# Patient Record
Sex: MALE | Race: BLACK OR AFRICAN AMERICAN | HISPANIC OR LATINO | Employment: OTHER | URBAN - METROPOLITAN AREA
[De-identification: names, ages, dates, MRNs, and addresses within clinical notes are randomized per-mention and may not be internally consistent; named-entity substitution may affect disease eponyms.]

---

## 2022-01-01 ENCOUNTER — HOSPITAL ENCOUNTER (OUTPATIENT)
Dept: MRI IMAGING | Facility: HOSPITAL | Age: 72
Discharge: HOME | End: 2022-12-28
Admitting: FAMILY MEDICINE

## 2022-01-01 ENCOUNTER — OFFICE VISIT (OUTPATIENT)
Dept: FAMILY MEDICINE CLINIC | Facility: CLINIC | Age: 72
End: 2022-01-01

## 2022-01-01 ENCOUNTER — TELEPHONE (OUTPATIENT)
Dept: FAMILY MEDICINE CLINIC | Facility: CLINIC | Age: 72
End: 2022-01-01

## 2022-01-01 ENCOUNTER — LAB (OUTPATIENT)
Dept: LAB | Facility: HOSPITAL | Age: 72
End: 2022-01-01

## 2022-01-01 VITALS
OXYGEN SATURATION: 99 % | HEART RATE: 68 BPM | BODY MASS INDEX: 31.08 KG/M2 | WEIGHT: 198 LBS | SYSTOLIC BLOOD PRESSURE: 148 MMHG | TEMPERATURE: 97.2 F | DIASTOLIC BLOOD PRESSURE: 81 MMHG | HEIGHT: 67 IN | RESPIRATION RATE: 18 BRPM

## 2022-01-01 VITALS
SYSTOLIC BLOOD PRESSURE: 152 MMHG | TEMPERATURE: 98.1 F | OXYGEN SATURATION: 99 % | HEART RATE: 56 BPM | RESPIRATION RATE: 18 BRPM | HEIGHT: 67 IN | DIASTOLIC BLOOD PRESSURE: 98 MMHG | BODY MASS INDEX: 31.3 KG/M2 | WEIGHT: 199.4 LBS

## 2022-01-01 DIAGNOSIS — R39.198 DIFFICULTY IN URINATION: ICD-10-CM

## 2022-01-01 DIAGNOSIS — R33.9 URINARY RETENTION: ICD-10-CM

## 2022-01-01 DIAGNOSIS — R97.20 ELEVATED PSA: Primary | ICD-10-CM

## 2022-01-01 DIAGNOSIS — Z13.220 SCREENING FOR LIPID DISORDERS: ICD-10-CM

## 2022-01-01 DIAGNOSIS — Z00.00 ANNUAL PHYSICAL EXAM: ICD-10-CM

## 2022-01-01 DIAGNOSIS — E78.2 MIXED HYPERLIPIDEMIA: ICD-10-CM

## 2022-01-01 DIAGNOSIS — R97.20 ELEVATED PSA MEASUREMENT: ICD-10-CM

## 2022-01-01 DIAGNOSIS — E66.09 CLASS 1 OBESITY DUE TO EXCESS CALORIES WITHOUT SERIOUS COMORBIDITY WITH BODY MASS INDEX (BMI) OF 31.0 TO 31.9 IN ADULT: Chronic | ICD-10-CM

## 2022-01-01 DIAGNOSIS — R41.3 MEMORY LOSS: Chronic | ICD-10-CM

## 2022-01-01 DIAGNOSIS — Z12.5 SCREENING FOR PROSTATE CANCER: ICD-10-CM

## 2022-01-01 DIAGNOSIS — R41.3 MEMORY LOSS: Primary | ICD-10-CM

## 2022-01-01 DIAGNOSIS — R41.3 MEMORY LOSS: ICD-10-CM

## 2022-01-01 DIAGNOSIS — E66.09 CLASS 1 OBESITY DUE TO EXCESS CALORIES WITHOUT SERIOUS COMORBIDITY WITH BODY MASS INDEX (BMI) OF 31.0 TO 31.9 IN ADULT: ICD-10-CM

## 2022-01-01 LAB
ALBUMIN SERPL-MCNC: 4 G/DL (ref 3.5–5.2)
ALBUMIN/GLOB SERPL: 1.3 G/DL
ALP SERPL-CCNC: 83 U/L (ref 39–117)
ALT SERPL W P-5'-P-CCNC: 10 U/L (ref 1–41)
ANION GAP SERPL CALCULATED.3IONS-SCNC: 11.5 MMOL/L (ref 5–15)
AST SERPL-CCNC: 26 U/L (ref 1–40)
BASOPHILS # BLD AUTO: 0.03 10*3/MM3 (ref 0–0.2)
BASOPHILS NFR BLD AUTO: 0.7 % (ref 0–1.5)
BILIRUB BLD-MCNC: NEGATIVE MG/DL
BILIRUB SERPL-MCNC: 0.6 MG/DL (ref 0–1.2)
BUN SERPL-MCNC: 15 MG/DL (ref 8–23)
BUN/CREAT SERPL: 14.3 (ref 7–25)
CALCIUM SPEC-SCNC: 9.8 MG/DL (ref 8.6–10.5)
CHLORIDE SERPL-SCNC: 102 MMOL/L (ref 98–107)
CHOLEST SERPL-MCNC: 249 MG/DL (ref 0–200)
CLARITY, POC: CLEAR
CO2 SERPL-SCNC: 25.5 MMOL/L (ref 22–29)
COLOR UR: YELLOW
CREAT SERPL-MCNC: 1.05 MG/DL (ref 0.76–1.27)
DEPRECATED RDW RBC AUTO: 42.1 FL (ref 37–54)
EGFRCR SERPLBLD CKD-EPI 2021: 76.4 ML/MIN/1.73
EOSINOPHIL # BLD AUTO: 0.05 10*3/MM3 (ref 0–0.4)
EOSINOPHIL NFR BLD AUTO: 1.1 % (ref 0.3–6.2)
ERYTHROCYTE [DISTWIDTH] IN BLOOD BY AUTOMATED COUNT: 13.4 % (ref 12.3–15.4)
EXPIRATION DATE: NORMAL
GLOBULIN UR ELPH-MCNC: 3.1 GM/DL
GLUCOSE SERPL-MCNC: 111 MG/DL (ref 65–99)
GLUCOSE UR STRIP-MCNC: NEGATIVE MG/DL
HCT VFR BLD AUTO: 44.2 % (ref 37.5–51)
HDLC SERPL-MCNC: 54 MG/DL (ref 40–60)
HGB BLD-MCNC: 14.4 G/DL (ref 13–17.7)
IMM GRANULOCYTES # BLD AUTO: 0.03 10*3/MM3 (ref 0–0.05)
IMM GRANULOCYTES NFR BLD AUTO: 0.7 % (ref 0–0.5)
KETONES UR QL: NEGATIVE
LDLC SERPL CALC-MCNC: 173 MG/DL (ref 0–100)
LDLC/HDLC SERPL: 3.15 {RATIO}
LEUKOCYTE EST, POC: NEGATIVE
LYMPHOCYTES # BLD AUTO: 1.49 10*3/MM3 (ref 0.7–3.1)
LYMPHOCYTES NFR BLD AUTO: 33.6 % (ref 19.6–45.3)
Lab: NORMAL
MCH RBC QN AUTO: 28.2 PG (ref 26.6–33)
MCHC RBC AUTO-ENTMCNC: 32.6 G/DL (ref 31.5–35.7)
MCV RBC AUTO: 86.7 FL (ref 79–97)
MONOCYTES # BLD AUTO: 0.65 10*3/MM3 (ref 0.1–0.9)
MONOCYTES NFR BLD AUTO: 14.6 % (ref 5–12)
NEUTROPHILS NFR BLD AUTO: 2.19 10*3/MM3 (ref 1.7–7)
NEUTROPHILS NFR BLD AUTO: 49.3 % (ref 42.7–76)
NITRITE UR-MCNC: NEGATIVE MG/ML
NRBC BLD AUTO-RTO: 0 /100 WBC (ref 0–0.2)
PH UR: 5.5 [PH] (ref 5–8)
PLATELET # BLD AUTO: 220 10*3/MM3 (ref 140–450)
PMV BLD AUTO: 10.1 FL (ref 6–12)
POTASSIUM SERPL-SCNC: 4.6 MMOL/L (ref 3.5–5.2)
PROT SERPL-MCNC: 7.1 G/DL (ref 6–8.5)
PROT UR STRIP-MCNC: NEGATIVE MG/DL
PSA SERPL-MCNC: 9.38 NG/ML (ref 0–4)
RBC # BLD AUTO: 5.1 10*6/MM3 (ref 4.14–5.8)
RBC # UR STRIP: NEGATIVE /UL
SODIUM SERPL-SCNC: 139 MMOL/L (ref 136–145)
SP GR UR: 1.02 (ref 1–1.03)
TRIGL SERPL-MCNC: 125 MG/DL (ref 0–150)
TSH SERPL DL<=0.05 MIU/L-ACNC: 2.46 UIU/ML (ref 0.27–4.2)
UROBILINOGEN UR QL: NORMAL
VLDLC SERPL-MCNC: 22 MG/DL (ref 5–40)
WBC NRBC COR # BLD: 4.44 10*3/MM3 (ref 3.4–10.8)

## 2022-01-01 PROCEDURE — 85025 COMPLETE CBC W/AUTO DIFF WBC: CPT

## 2022-01-01 PROCEDURE — 84443 ASSAY THYROID STIM HORMONE: CPT

## 2022-01-01 PROCEDURE — 70551 MRI BRAIN STEM W/O DYE: CPT

## 2022-01-01 PROCEDURE — 99204 OFFICE O/P NEW MOD 45 MIN: CPT | Performed by: FAMILY MEDICINE

## 2022-01-01 PROCEDURE — 81003 URINALYSIS AUTO W/O SCOPE: CPT | Performed by: FAMILY MEDICINE

## 2022-01-01 PROCEDURE — 36415 COLL VENOUS BLD VENIPUNCTURE: CPT

## 2022-01-01 PROCEDURE — 80053 COMPREHEN METABOLIC PANEL: CPT

## 2022-01-01 PROCEDURE — 99214 OFFICE O/P EST MOD 30 MIN: CPT | Performed by: FAMILY MEDICINE

## 2022-01-01 PROCEDURE — 80061 LIPID PANEL: CPT

## 2022-01-01 PROCEDURE — G0103 PSA SCREENING: HCPCS

## 2022-01-01 RX ORDER — TAMSULOSIN HYDROCHLORIDE 0.4 MG/1
1 CAPSULE ORAL DAILY
Qty: 30 CAPSULE | Refills: 2 | Status: SHIPPED | OUTPATIENT
Start: 2022-01-01 | End: 2023-01-01

## 2022-01-01 RX ORDER — ROSUVASTATIN CALCIUM 20 MG/1
20 TABLET, COATED ORAL DAILY
Qty: 90 TABLET | Refills: 1 | Status: SHIPPED | OUTPATIENT
Start: 2022-01-01 | End: 2023-01-01 | Stop reason: SDUPTHER

## 2022-12-05 NOTE — TELEPHONE ENCOUNTER
Caller: LATOYA PATTERSON    Relationship: Child    Best call back number:  OR      MATTHEW ALFONSO    [phone]    What is the best time to reach you:  ANYTIME     Who are you requesting to speak with (clinical staff, provider,  specific staff member): CLINICAL          What was the call regarding:      THE PATIENT'S DAUGHTER SAID THE PATIENT HAS MEMORY LOSS AND WILL NOT  GIVE ALL OF THE INFORMATION NEEDED TO TELL THE PROVIDER. SHE SAID THE PATIENT IS NEEDING TO BE SEEN BY NEUROLOGIST. SHE IS REQUESTING A CALL FROM PCP TO GO OVER THE PATIENT'S MEDICAL HISTORY.    SHE SAID IT WILL BE OK TO CALL HER SISTER SINCE SHE IS THE EMERGENCY CONTACT FOR THE PATIENT.             Do you require a callback:  YES

## 2022-12-05 NOTE — PROGRESS NOTES
"Chief Complaint  Establish Care, Ear Fullness (Right ), and Difficulty Urinating    Subjective        Vince Jain presents to [facility]  History of Present Illness  He is here today to establish relations as a new patient. He is  and has three sons and three daughters. They have 7 grandchildren.     Has hx of closed head injury x 2. Having memory trouble that is worsening. Family concerned.     The patient has no other complaints today and denies chest pain, shortness of breath, weakness, numbness, nausea, vomiting, diarrhea, dizziness or syncopal event.        Objective   Vital Signs:  /98 (BP Location: Left arm, Patient Position: Sitting)   Pulse 56   Temp 98.1 °F (36.7 °C)   Resp 18   Ht 170.2 cm (67\")   Wt 90.4 kg (199 lb 6.4 oz)   SpO2 99%   BMI 31.23 kg/m²   Estimated body mass index is 31.23 kg/m² as calculated from the following:    Height as of this encounter: 170.2 cm (67\").    Weight as of this encounter: 90.4 kg (199 lb 6.4 oz).          Physical Exam  Vitals reviewed.   Constitutional:       Appearance: Normal appearance. He is well-developed.   HENT:      Head: Normocephalic and atraumatic.      Right Ear: External ear normal.      Left Ear: External ear normal.      Mouth/Throat:      Pharynx: No oropharyngeal exudate.   Eyes:      Conjunctiva/sclera: Conjunctivae normal.      Pupils: Pupils are equal, round, and reactive to light.   Neck:      Vascular: No carotid bruit.   Cardiovascular:      Rate and Rhythm: Normal rate and regular rhythm.      Heart sounds: No murmur heard.    No friction rub. No gallop.   Pulmonary:      Effort: Pulmonary effort is normal.      Breath sounds: Normal breath sounds. No wheezing or rhonchi.   Abdominal:      General: There is no distension.   Skin:     General: Skin is warm and dry.   Neurological:      Mental Status: He is alert and oriented to person, place, and time.      Cranial Nerves: No cranial " nerve deficit.      Motor: No weakness.   Psychiatric:         Mood and Affect: Mood and affect normal.         Behavior: Behavior normal.         Thought Content: Thought content normal.         Judgment: Judgment normal.        Result Review :    CMP    CMP 12/5/22   Glucose 111 (A)   BUN 15   Creatinine 1.05   Sodium 139   Potassium 4.6   Chloride 102   Calcium 9.8   Albumin 4.00   Total Bilirubin 0.6   Alkaline Phosphatase 83   AST (SGOT) 26   ALT (SGPT) 10   (A) Abnormal value            CBC    CBC 12/5/22   WBC 4.44   RBC 5.10   Hemoglobin 14.4   Hematocrit 44.2   MCV 86.7   MCH 28.2   MCHC 32.6   RDW 13.4   Platelets 220           Lipid Panel    Lipid Panel 12/5/22   Total Cholesterol 249 (A)   Triglycerides 125   HDL Cholesterol 54   VLDL Cholesterol 22   LDL Cholesterol  173 (A)   LDL/HDL Ratio 3.15   (A) Abnormal value            TSH    TSH 12/5/22   TSH 2.460                     Assessment and Plan   Diagnoses and all orders for this visit:    1. Memory loss (Primary)  Assessment & Plan:  MRI ordered today. We will see him back in a week, review labs, review image and do a MMSE.     Orders:  -     MRI Brain Without Contrast; Future    2. Difficulty in urination  -     POCT urinalysis dipstick, automated    3. Annual physical exam  -     Comprehensive Metabolic Panel; Future  -     CBC & Differential; Future  -     TSH; Future    4. Screening for prostate cancer  -     PSA Screen; Future    5. Screening for lipid disorders  -     Lipid Panel; Future    6. Class 1 obesity due to excess calories without serious comorbidity with body mass index (BMI) of 31.0 to 31.9 in adult  Assessment & Plan:  Patient's (Body mass index is 31.23 kg/m².) indicates that they are obese (BMI >30) with health conditions that include none . Weight is newly identified. BMI is is above average; BMI management plan is completed. We discussed low calorie, low carb based diet program, portion control and increasing exercise.        Other orders  -     tamsulosin (FLOMAX) 0.4 MG capsule 24 hr capsule; Take 1 capsule by mouth Daily.  Dispense: 30 capsule; Refill: 2           Follow Up   Return in about 1 week (around 12/12/2022).  Patient was given instructions and counseling regarding his condition or for health maintenance advice. Please see specific information pulled into the AVS if appropriate.

## 2022-12-08 NOTE — TELEPHONE ENCOUNTER
Spoke with patient's daughter Reddy. Patient has follow up on 12/19 and MRI is scheduled 12/28. Reddy would like to go over results with Dr. Schneider when they are available.     She is concerned about the decline in her fathers memory. He has fallen multiple times, he seems to have short term memory loss, and whenever they try to dicuss things with him he gets upset/discouraged, not agitated but sad like because of how his memory has changed    Patient's daughter is aware of scheduled appointments and is on  verbal

## 2022-12-10 PROBLEM — E66.09 CLASS 1 OBESITY DUE TO EXCESS CALORIES WITHOUT SERIOUS COMORBIDITY WITH BODY MASS INDEX (BMI) OF 31.0 TO 31.9 IN ADULT: Status: ACTIVE | Noted: 2022-01-01

## 2022-12-10 PROBLEM — R41.3 MEMORY LOSS: Chronic | Status: ACTIVE | Noted: 2022-01-01

## 2022-12-10 PROBLEM — R41.3 MEMORY LOSS: Status: ACTIVE | Noted: 2022-01-01

## 2022-12-10 NOTE — ASSESSMENT & PLAN NOTE
Patient's (Body mass index is 31.23 kg/m².) indicates that they are obese (BMI >30) with health conditions that include none . Weight is newly identified. BMI is is above average; BMI management plan is completed. We discussed low calorie, low carb based diet program, portion control and increasing exercise.

## 2022-12-19 PROBLEM — E78.2 MIXED HYPERLIPIDEMIA: Status: ACTIVE | Noted: 2022-01-01

## 2022-12-19 PROBLEM — E78.2 MIXED HYPERLIPIDEMIA: Chronic | Status: ACTIVE | Noted: 2022-01-01

## 2022-12-19 PROBLEM — R33.9 URINARY RETENTION: Status: ACTIVE | Noted: 2022-01-01

## 2022-12-19 PROBLEM — E66.09 CLASS 1 OBESITY DUE TO EXCESS CALORIES WITHOUT SERIOUS COMORBIDITY WITH BODY MASS INDEX (BMI) OF 31.0 TO 31.9 IN ADULT: Chronic | Status: ACTIVE | Noted: 2022-01-01

## 2022-12-19 PROBLEM — R97.20 ELEVATED PSA: Status: ACTIVE | Noted: 2022-01-01

## 2022-12-19 NOTE — ASSESSMENT & PLAN NOTE
Lipid abnormalities are newly diagnosed.  Nutritional counseling was provided. and Pharmacotherapy as ordered. He was started on Crestor 20 mg daily.   Lipids will be reassessed in 6 months.    The 10-year ASCVD risk score (Nickolas RAMIREZ, et al., 2019) is: 24.4%    Values used to calculate the score:      Age: 72 years      Sex: Male      Is Non- : No      Diabetic: No      Tobacco smoker: No      Systolic Blood Pressure: 148 mmHg      Is BP treated: No      HDL Cholesterol: 54 mg/dL      Total Cholesterol: 249 mg/dL

## 2022-12-19 NOTE — ASSESSMENT & PLAN NOTE
Patient's (Body mass index is 31 kg/m².) indicates that they are obese (BMI >30) with health conditions that include dyslipidemias . Weight is improving with lifestyle modifications. BMI is is above average; BMI management plan is completed. We discussed low calorie, low carb based diet program, portion control and increasing exercise.

## 2022-12-19 NOTE — PROGRESS NOTES
"Chief Complaint  Ear Fullness (Right worse then left )    Subjective        Vince Jain presents to [facility]  History of Present Illness   He is here today for management of his chronic medical conditions. He is  and has three sons and three daughters. They have 7 grandchildren.      Has hx of closed head injury x 2. Having memory trouble that is worsening. Family concerned.      The patient has no other complaints today and denies chest pain, shortness of breath, weakness, numbness, nausea, vomiting, diarrhea, dizziness or syncopal event.      Objective   Vital Signs:  /81 (BP Location: Left arm, Patient Position: Sitting)   Pulse 68   Temp 97.2 °F (36.2 °C)   Resp 18   Ht 170.2 cm (67.01\")   Wt 89.8 kg (198 lb)   SpO2 99%   BMI 31.00 kg/m²   Estimated body mass index is 31 kg/m² as calculated from the following:    Height as of this encounter: 170.2 cm (67.01\").    Weight as of this encounter: 89.8 kg (198 lb).          Physical Exam  Vitals reviewed.   Constitutional:       Appearance: Normal appearance. He is well-developed.   HENT:      Head: Normocephalic and atraumatic.      Right Ear: External ear normal.      Left Ear: External ear normal.      Mouth/Throat:      Pharynx: No oropharyngeal exudate.   Eyes:      Conjunctiva/sclera: Conjunctivae normal.      Pupils: Pupils are equal, round, and reactive to light.   Neck:      Vascular: No carotid bruit.   Cardiovascular:      Rate and Rhythm: Normal rate and regular rhythm.      Heart sounds: No murmur heard.    No friction rub. No gallop.   Pulmonary:      Effort: Pulmonary effort is normal.      Breath sounds: Normal breath sounds. No wheezing or rhonchi.   Abdominal:      General: There is no distension.   Skin:     General: Skin is warm and dry.   Neurological:      Mental Status: He is alert and oriented to person, place, and time.      Cranial Nerves: No cranial nerve deficit.      Motor: No " weakness.   Psychiatric:         Mood and Affect: Mood and affect normal.         Behavior: Behavior normal.         Thought Content: Thought content normal.         Judgment: Judgment normal.        Result Review :    CMP    CMP 12/5/22   Glucose 111 (A)   BUN 15   Creatinine 1.05   Sodium 139   Potassium 4.6   Chloride 102   Calcium 9.8   Albumin 4.00   Total Bilirubin 0.6   Alkaline Phosphatase 83   AST (SGOT) 26   ALT (SGPT) 10   (A) Abnormal value            CBC    CBC 12/5/22   WBC 4.44   RBC 5.10   Hemoglobin 14.4   Hematocrit 44.2   MCV 86.7   MCH 28.2   MCHC 32.6   RDW 13.4   Platelets 220           Lipid Panel    Lipid Panel 12/5/22   Total Cholesterol 249 (A)   Triglycerides 125   HDL Cholesterol 54   VLDL Cholesterol 22   LDL Cholesterol  173 (A)   LDL/HDL Ratio 3.15   (A) Abnormal value            TSH    TSH 12/5/22   TSH 2.460                     Assessment and Plan   Diagnoses and all orders for this visit:    1. Elevated PSA (Primary)  Assessment & Plan:  He was educated about the three major causes of elevated PSA.   1. Enlarged prostate  2. Prostatitis  3. Prostate cancer    He has a referral to urology.       2. Memory loss  -     Ambulatory Referral to Neurology    3. Urinary retention  Assessment & Plan:  His symptoms are doing better with Flomax 0.4 mg nightly.       4. Mixed hyperlipidemia  Assessment & Plan:  Lipid abnormalities are newly diagnosed.  Nutritional counseling was provided. and Pharmacotherapy as ordered. He was started on Crestor 20 mg daily.   Lipids will be reassessed in 6 months.    The 10-year ASCVD risk score (Nickolas RAMIREZ, et al., 2019) is: 24.4%    Values used to calculate the score:      Age: 72 years      Sex: Male      Is Non- : No      Diabetic: No      Tobacco smoker: No      Systolic Blood Pressure: 148 mmHg      Is BP treated: No      HDL Cholesterol: 54 mg/dL      Total Cholesterol: 249 mg/dL        5. Class 1 obesity due to excess calories  without serious comorbidity with body mass index (BMI) of 31.0 to 31.9 in adult  Assessment & Plan:  Patient's (Body mass index is 31 kg/m².) indicates that they are obese (BMI >30) with health conditions that include dyslipidemias . Weight is improving with lifestyle modifications. BMI is is above average; BMI management plan is completed. We discussed low calorie, low carb based diet program, portion control and increasing exercise.       Other orders  -     rosuvastatin (Crestor) 20 MG tablet; Take 1 tablet by mouth Daily.  Dispense: 90 tablet; Refill: 1           Follow Up   Return in about 2 months (around 2/19/2023).  Patient was given instructions and counseling regarding his condition or for health maintenance advice. Please see specific information pulled into the AVS if appropriate.

## 2022-12-19 NOTE — ASSESSMENT & PLAN NOTE
He was educated about the three major causes of elevated PSA.   1. Enlarged prostate  2. Prostatitis  3. Prostate cancer    He has a referral to urology.

## 2023-01-01 ENCOUNTER — APPOINTMENT (OUTPATIENT)
Dept: GENERAL RADIOLOGY | Facility: HOSPITAL | Age: 73
DRG: 215 | End: 2023-01-01
Payer: MEDICARE

## 2023-01-01 ENCOUNTER — OFFICE VISIT (OUTPATIENT)
Dept: UROLOGY | Facility: CLINIC | Age: 73
End: 2023-01-01
Payer: MEDICARE

## 2023-01-01 ENCOUNTER — TELEPHONE (OUTPATIENT)
Dept: UROLOGY | Facility: CLINIC | Age: 73
End: 2023-01-01
Payer: MEDICARE

## 2023-01-01 ENCOUNTER — LAB (OUTPATIENT)
Dept: LAB | Facility: HOSPITAL | Age: 73
End: 2023-01-01
Payer: MEDICARE

## 2023-01-01 ENCOUNTER — APPOINTMENT (OUTPATIENT)
Dept: CARDIOLOGY | Facility: HOSPITAL | Age: 73
DRG: 215 | End: 2023-01-01
Payer: MEDICARE

## 2023-01-01 ENCOUNTER — OFFICE VISIT (OUTPATIENT)
Dept: FAMILY MEDICINE CLINIC | Facility: CLINIC | Age: 73
End: 2023-01-01
Payer: MEDICARE

## 2023-01-01 ENCOUNTER — PREP FOR SURGERY (OUTPATIENT)
Dept: OTHER | Facility: HOSPITAL | Age: 73
End: 2023-01-01
Payer: MEDICARE

## 2023-01-01 ENCOUNTER — HOSPITAL ENCOUNTER (INPATIENT)
Facility: HOSPITAL | Age: 73
LOS: 4 days | Discharge: ANOTHER HOSPITAL | DRG: 215 | End: 2023-03-10
Attending: INTERNAL MEDICINE | Admitting: INTERNAL MEDICINE
Payer: MEDICARE

## 2023-01-01 VITALS
HEIGHT: 68 IN | HEART RATE: 62 BPM | TEMPERATURE: 96.2 F | SYSTOLIC BLOOD PRESSURE: 145 MMHG | BODY MASS INDEX: 32.09 KG/M2 | DIASTOLIC BLOOD PRESSURE: 97 MMHG | WEIGHT: 211.7 LBS | OXYGEN SATURATION: 98 % | RESPIRATION RATE: 18 BRPM

## 2023-01-01 VITALS
HEART RATE: 91 BPM | DIASTOLIC BLOOD PRESSURE: 64 MMHG | SYSTOLIC BLOOD PRESSURE: 105 MMHG | HEIGHT: 65 IN | OXYGEN SATURATION: 94 % | TEMPERATURE: 99 F | WEIGHT: 210.76 LBS | RESPIRATION RATE: 20 BRPM | BODY MASS INDEX: 35.11 KG/M2

## 2023-01-01 VITALS — HEIGHT: 68 IN | WEIGHT: 208 LBS | BODY MASS INDEX: 31.52 KG/M2

## 2023-01-01 DIAGNOSIS — E66.09 CLASS 1 OBESITY DUE TO EXCESS CALORIES WITHOUT SERIOUS COMORBIDITY WITH BODY MASS INDEX (BMI) OF 31.0 TO 31.9 IN ADULT: Chronic | ICD-10-CM

## 2023-01-01 DIAGNOSIS — I21.3 ST ELEVATION MYOCARDIAL INFARCTION (STEMI), UNSPECIFIED ARTERY: Primary | ICD-10-CM

## 2023-01-01 DIAGNOSIS — R97.20 ELEVATED PSA: ICD-10-CM

## 2023-01-01 DIAGNOSIS — R73.9 ELEVATED BLOOD SUGAR: ICD-10-CM

## 2023-01-01 DIAGNOSIS — R60.0 LOWER EXTREMITY EDEMA: ICD-10-CM

## 2023-01-01 DIAGNOSIS — E78.2 MIXED HYPERLIPIDEMIA: Chronic | ICD-10-CM

## 2023-01-01 DIAGNOSIS — R60.0 LOWER EXTREMITY EDEMA: Primary | ICD-10-CM

## 2023-01-01 DIAGNOSIS — R97.20 ELEVATED PROSTATE SPECIFIC ANTIGEN (PSA): Primary | ICD-10-CM

## 2023-01-01 DIAGNOSIS — R57.0 CARDIOGENIC SHOCK: ICD-10-CM

## 2023-01-01 DIAGNOSIS — R97.20 ELEVATED PSA: Primary | ICD-10-CM

## 2023-01-01 LAB
ACT BLD: 197 SECONDS (ref 89–137)
ACT BLD: 227 SECONDS (ref 89–137)
ACT BLD: 245 SECONDS (ref 89–137)
ACT BLD: 275 SECONDS (ref 89–137)
ACT BLD: 275 SECONDS (ref 89–137)
ACT BLD: 311 SECONDS (ref 89–137)
ACT BLD: 335 SECONDS (ref 89–137)
ALBUMIN SERPL-MCNC: 3 G/DL (ref 3.5–5.2)
ALBUMIN SERPL-MCNC: 3.1 G/DL (ref 3.5–5.2)
ALBUMIN SERPL-MCNC: 3.4 G/DL (ref 3.5–5.2)
ALBUMIN SERPL-MCNC: 3.5 G/DL (ref 3.5–5.2)
ALBUMIN SERPL-MCNC: 4.3 G/DL (ref 3.5–5.2)
ALBUMIN/GLOB SERPL: 1.3 G/DL
ALBUMIN/GLOB SERPL: 1.4 G/DL
ALBUMIN/GLOB SERPL: 1.4 G/DL
ALBUMIN/GLOB SERPL: 1.6 G/DL
ALBUMIN/GLOB SERPL: 1.6 G/DL
ALBUMIN/GLOB SERPL: 1.7 G/DL
ALP SERPL-CCNC: 102 U/L (ref 39–117)
ALP SERPL-CCNC: 147 U/L (ref 39–117)
ALP SERPL-CCNC: 78 U/L (ref 39–117)
ALP SERPL-CCNC: 78 U/L (ref 39–117)
ALP SERPL-CCNC: 84 U/L (ref 39–117)
ALP SERPL-CCNC: 92 U/L (ref 39–117)
ALT SERPL W P-5'-P-CCNC: 19 U/L (ref 1–41)
ALT SERPL W P-5'-P-CCNC: 60 U/L (ref 1–41)
ALT SERPL W P-5'-P-CCNC: 70 U/L (ref 1–41)
ALT SERPL W P-5'-P-CCNC: 78 U/L (ref 1–41)
ALT SERPL W P-5'-P-CCNC: 80 U/L (ref 1–41)
ALT SERPL W P-5'-P-CCNC: 94 U/L (ref 1–41)
ANION GAP SERPL CALCULATED.3IONS-SCNC: 11.3 MMOL/L (ref 5–15)
ANION GAP SERPL CALCULATED.3IONS-SCNC: 11.7 MMOL/L (ref 5–15)
ANION GAP SERPL CALCULATED.3IONS-SCNC: 12 MMOL/L (ref 5–15)
ANION GAP SERPL CALCULATED.3IONS-SCNC: 12.3 MMOL/L (ref 5–15)
ANION GAP SERPL CALCULATED.3IONS-SCNC: 13 MMOL/L (ref 5–15)
ANION GAP SERPL CALCULATED.3IONS-SCNC: 13.4 MMOL/L (ref 5–15)
ANION GAP SERPL CALCULATED.3IONS-SCNC: 13.6 MMOL/L (ref 5–15)
ANION GAP SERPL CALCULATED.3IONS-SCNC: 15.5 MMOL/L (ref 5–15)
ANION GAP SERPL CALCULATED.3IONS-SCNC: 9.9 MMOL/L (ref 5–15)
ARTERIAL PATENCY WRIST A: ABNORMAL
ARTERIAL PATENCY WRIST A: POSITIVE
ARTERIAL PATENCY WRIST A: POSITIVE
ASCENDING AORTA: 3.3 CM
AST SERPL-CCNC: 210 U/L (ref 1–40)
AST SERPL-CCNC: 25 U/L (ref 1–40)
AST SERPL-CCNC: 280 U/L (ref 1–40)
AST SERPL-CCNC: 317 U/L (ref 1–40)
AST SERPL-CCNC: 388 U/L (ref 1–40)
AST SERPL-CCNC: 519 U/L (ref 1–40)
BASE EXCESS BLDA CALC-SCNC: -7.5 MMOL/L (ref -2–2)
BASE EXCESS BLDA CALC-SCNC: -9 MMOL/L (ref -2–2)
BASE EXCESS BLDA CALC-SCNC: -9.6 MMOL/L (ref -2–2)
BASOPHILS # BLD AUTO: 0.02 10*3/MM3 (ref 0–0.2)
BASOPHILS NFR BLD AUTO: 0.5 % (ref 0–1.5)
BDY SITE: ABNORMAL
BH CV ECHO MEAS - AO MAX PG: 3 MMHG
BH CV ECHO MEAS - AO MEAN PG: 2 MMHG
BH CV ECHO MEAS - AO ROOT DIAM: 2.9 CM
BH CV ECHO MEAS - AO V2 MAX: 88.4 CM/SEC
BH CV ECHO MEAS - AO V2 VTI: 9.6 CM
BH CV ECHO MEAS - EDV(MOD-SP2): 104 ML
BH CV ECHO MEAS - EDV(MOD-SP4): 97 ML
BH CV ECHO MEAS - EF(MOD-BP): 38 %
BH CV ECHO MEAS - ESV(MOD-SP2): 68 ML
BH CV ECHO MEAS - ESV(MOD-SP4): 56 ML
BH CV ECHO MEAS - IVSD: 1.8 CM
BH CV ECHO MEAS - LA DIMENSION: 6.5 CM
BH CV ECHO MEAS - LAT PEAK E' VEL: 12.7 CM/SEC
BH CV ECHO MEAS - LV MAX PG: 4 MMHG
BH CV ECHO MEAS - LV MEAN PG: 2 MMHG
BH CV ECHO MEAS - LV V1 MAX: 95.1 CM/SEC
BH CV ECHO MEAS - LV V1 VTI: 9.8 CM
BH CV ECHO MEAS - LVIDD: 5 CM
BH CV ECHO MEAS - LVIDS: 3.8 CM
BH CV ECHO MEAS - LVOT DIAM: 2 CM
BH CV ECHO MEAS - LVPWD: 1.4 CM
BH CV ECHO MEAS - MED PEAK E' VEL: 3.7 CM/SEC
BH CV ECHO MEAS - MR MAX PG: 49 MMHG
BH CV ECHO MEAS - MR MAX VEL: 349 CM/SEC
BH CV ECHO MEAS - MR MEAN PG: 23 MMHG
BH CV ECHO MEAS - MR MEAN VEL: 211 CM/SEC
BH CV ECHO MEAS - MR VTI: 67 CM
BH CV ECHO MEAS - MV DEC TIME: 151 MSEC
BH CV ECHO MEAS - MV E MAX VEL: 122 CM/SEC
BH CV ECHO MEAS - MV E/A: 3.1
BH CV ECHO MEAS - MV MAX PG: 6 MMHG
BH CV ECHO MEAS - MV MEAN PG: 2 MMHG
BH CV ECHO MEAS - MV V2 VTI: 17.5 CM
BH CV ECHO MEAS - RVDD: 2.5 CM
BH CV ECHO MEAS - RVSP: 61 MMHG
BH CV ECHO MEAS - TR MAX PG: 53 MMHG
BH CV ECHO MEAS - TR MAX VEL: 363 CM/SEC
BH CV ECHO MEASUREMENTS AVERAGE E/E' RATIO: 14.88
BILIRUB BLD-MCNC: NEGATIVE MG/DL
BILIRUB SERPL-MCNC: 0.4 MG/DL (ref 0–1.2)
BILIRUB SERPL-MCNC: 0.4 MG/DL (ref 0–1.2)
BILIRUB SERPL-MCNC: 0.5 MG/DL (ref 0–1.2)
BILIRUB SERPL-MCNC: 0.6 MG/DL (ref 0–1.2)
BILIRUB SERPL-MCNC: 0.6 MG/DL (ref 0–1.2)
BILIRUB SERPL-MCNC: 1 MG/DL (ref 0–1.2)
BUN SERPL-MCNC: 17 MG/DL (ref 8–23)
BUN SERPL-MCNC: 18 MG/DL (ref 8–23)
BUN SERPL-MCNC: 26 MG/DL (ref 8–23)
BUN SERPL-MCNC: 30 MG/DL (ref 8–23)
BUN SERPL-MCNC: 38 MG/DL (ref 8–23)
BUN SERPL-MCNC: 45 MG/DL (ref 8–23)
BUN SERPL-MCNC: 46 MG/DL (ref 8–23)
BUN SERPL-MCNC: 49 MG/DL (ref 8–23)
BUN SERPL-MCNC: 55 MG/DL (ref 8–23)
BUN/CREAT SERPL: 14.3 (ref 7–25)
BUN/CREAT SERPL: 15.2 (ref 7–25)
BUN/CREAT SERPL: 16.2 (ref 7–25)
BUN/CREAT SERPL: 16.3 (ref 7–25)
BUN/CREAT SERPL: 18.8 (ref 7–25)
BUN/CREAT SERPL: 21.4 (ref 7–25)
BUN/CREAT SERPL: 22.2 (ref 7–25)
BUN/CREAT SERPL: 23.6 (ref 7–25)
BUN/CREAT SERPL: 27.4 (ref 7–25)
CA-I BLDA-SCNC: 1.03 MMOL/L (ref 1.13–1.32)
CALCIUM SPEC-SCNC: 10 MG/DL (ref 8.6–10.5)
CALCIUM SPEC-SCNC: 7.4 MG/DL (ref 8.6–10.5)
CALCIUM SPEC-SCNC: 7.5 MG/DL (ref 8.6–10.5)
CALCIUM SPEC-SCNC: 7.8 MG/DL (ref 8.6–10.5)
CALCIUM SPEC-SCNC: 7.9 MG/DL (ref 8.6–10.5)
CALCIUM SPEC-SCNC: 7.9 MG/DL (ref 8.6–10.5)
CALCIUM SPEC-SCNC: 8 MG/DL (ref 8.6–10.5)
CALCIUM SPEC-SCNC: 8 MG/DL (ref 8.6–10.5)
CALCIUM SPEC-SCNC: 8.2 MG/DL (ref 8.6–10.5)
CHLORIDE BLDA-SCNC: 107 MMOL/L (ref 98–106)
CHLORIDE SERPL-SCNC: 101 MMOL/L (ref 98–107)
CHLORIDE SERPL-SCNC: 102 MMOL/L (ref 98–107)
CHLORIDE SERPL-SCNC: 104 MMOL/L (ref 98–107)
CHLORIDE SERPL-SCNC: 105 MMOL/L (ref 98–107)
CHLORIDE SERPL-SCNC: 106 MMOL/L (ref 98–107)
CHLORIDE SERPL-SCNC: 109 MMOL/L (ref 98–107)
CHLORIDE SERPL-SCNC: 110 MMOL/L (ref 98–107)
CHLORIDE SERPL-SCNC: 97 MMOL/L (ref 98–107)
CHLORIDE SERPL-SCNC: 99 MMOL/L (ref 98–107)
CHOLEST SERPL-MCNC: 108 MG/DL (ref 0–200)
CHOLEST SERPL-MCNC: 160 MG/DL (ref 0–200)
CLARITY, POC: CLEAR
CO2 SERPL-SCNC: 16.4 MMOL/L (ref 22–29)
CO2 SERPL-SCNC: 18 MMOL/L (ref 22–29)
CO2 SERPL-SCNC: 20.3 MMOL/L (ref 22–29)
CO2 SERPL-SCNC: 20.6 MMOL/L (ref 22–29)
CO2 SERPL-SCNC: 20.7 MMOL/L (ref 22–29)
CO2 SERPL-SCNC: 21.7 MMOL/L (ref 22–29)
CO2 SERPL-SCNC: 24.5 MMOL/L (ref 22–29)
CO2 SERPL-SCNC: 25 MMOL/L (ref 22–29)
CO2 SERPL-SCNC: 25.1 MMOL/L (ref 22–29)
COHGB MFR BLD: 0.1 % (ref 0–1.5)
COHGB MFR BLD: 0.3 % (ref 0–1.5)
COHGB MFR BLD: 0.3 % (ref 0–1.5)
COLOR UR: YELLOW
CREAT SERPL-MCNC: 1.11 MG/DL (ref 0.76–1.27)
CREAT SERPL-MCNC: 1.19 MG/DL (ref 0.76–1.27)
CREAT SERPL-MCNC: 1.6 MG/DL (ref 0.76–1.27)
CREAT SERPL-MCNC: 1.98 MG/DL (ref 0.76–1.27)
CREAT SERPL-MCNC: 2.01 MG/DL (ref 0.76–1.27)
CREAT SERPL-MCNC: 2.02 MG/DL (ref 0.76–1.27)
CREAT SERPL-MCNC: 2.03 MG/DL (ref 0.76–1.27)
CREAT SERPL-MCNC: 2.08 MG/DL (ref 0.76–1.27)
CREAT SERPL-MCNC: 2.15 MG/DL (ref 0.76–1.27)
D-LACTATE SERPL-SCNC: 1.7 MMOL/L (ref 0.5–2)
D-LACTATE SERPL-SCNC: 1.9 MMOL/L (ref 0.5–2)
D-LACTATE SERPL-SCNC: 2.1 MMOL/L (ref 0.5–2)
D-LACTATE SERPL-SCNC: 2.7 MMOL/L (ref 0.5–2)
D-LACTATE SERPL-SCNC: 3.1 MMOL/L (ref 0.5–2)
D-LACTATE SERPL-SCNC: 3.1 MMOL/L (ref 0.5–2)
D-LACTATE SERPL-SCNC: 3.4 MMOL/L (ref 0.5–2)
D-LACTATE SERPL-SCNC: 4.5 MMOL/L (ref 0.5–2)
D-LACTATE SERPL-SCNC: 4.8 MMOL/L (ref 0.5–2)
D-LACTATE SERPL-SCNC: 5.1 MMOL/L (ref 0.5–2)
D-LACTATE SERPL-SCNC: 6.8 MMOL/L (ref 0.5–2)
DEPRECATED RDW RBC AUTO: 42 FL (ref 37–54)
DEPRECATED RDW RBC AUTO: 42.4 FL (ref 37–54)
DEPRECATED RDW RBC AUTO: 42.7 FL (ref 37–54)
DEPRECATED RDW RBC AUTO: 44.6 FL (ref 37–54)
DEPRECATED RDW RBC AUTO: 45.5 FL (ref 37–54)
EGFRCR SERPLBLD CKD-EPI 2021: 32.3 ML/MIN/1.73
EGFRCR SERPLBLD CKD-EPI 2021: 33.6 ML/MIN/1.73
EGFRCR SERPLBLD CKD-EPI 2021: 34.6 ML/MIN/1.73
EGFRCR SERPLBLD CKD-EPI 2021: 34.8 ML/MIN/1.73
EGFRCR SERPLBLD CKD-EPI 2021: 35 ML/MIN/1.73
EGFRCR SERPLBLD CKD-EPI 2021: 35.7 ML/MIN/1.73
EGFRCR SERPLBLD CKD-EPI 2021: 46.1 ML/MIN/1.73
EGFRCR SERPLBLD CKD-EPI 2021: 65.7 ML/MIN/1.73
EGFRCR SERPLBLD CKD-EPI 2021: 71.4 ML/MIN/1.73
EOSINOPHIL # BLD AUTO: 0.02 10*3/MM3 (ref 0–0.4)
EOSINOPHIL NFR BLD AUTO: 0.5 % (ref 0.3–6.2)
ERYTHROCYTE [DISTWIDTH] IN BLOOD BY AUTOMATED COUNT: 13.7 % (ref 12.3–15.4)
ERYTHROCYTE [DISTWIDTH] IN BLOOD BY AUTOMATED COUNT: 14 % (ref 12.3–15.4)
ERYTHROCYTE [DISTWIDTH] IN BLOOD BY AUTOMATED COUNT: 14.2 % (ref 12.3–15.4)
ERYTHROCYTE [DISTWIDTH] IN BLOOD BY AUTOMATED COUNT: 14.2 % (ref 12.3–15.4)
ERYTHROCYTE [DISTWIDTH] IN BLOOD BY AUTOMATED COUNT: 14.4 % (ref 12.3–15.4)
EXPIRATION DATE: 224
FHHB: 1 % (ref 0–5)
FHHB: 1.3 % (ref 0–5)
FHHB: 10.3 % (ref 0–5)
GAS FLOW AIRWAY: 4 LPM
GEN 5 2HR TROPONIN T REFLEX: ABNORMAL NG/L
GLOBULIN UR ELPH-MCNC: 2 GM/DL
GLOBULIN UR ELPH-MCNC: 2.1 GM/DL
GLOBULIN UR ELPH-MCNC: 2.2 GM/DL
GLOBULIN UR ELPH-MCNC: 2.4 GM/DL
GLOBULIN UR ELPH-MCNC: 2.5 GM/DL
GLOBULIN UR ELPH-MCNC: 2.6 GM/DL
GLUCOSE BLDA-MCNC: 309 MG/DL (ref 70–99)
GLUCOSE BLDC GLUCOMTR-MCNC: 128 MG/DL (ref 70–99)
GLUCOSE BLDC GLUCOMTR-MCNC: 158 MG/DL (ref 70–99)
GLUCOSE BLDC GLUCOMTR-MCNC: 199 MG/DL (ref 70–99)
GLUCOSE BLDC GLUCOMTR-MCNC: 200 MG/DL (ref 70–99)
GLUCOSE BLDC GLUCOMTR-MCNC: 204 MG/DL (ref 70–99)
GLUCOSE BLDC GLUCOMTR-MCNC: 206 MG/DL (ref 70–99)
GLUCOSE BLDC GLUCOMTR-MCNC: 207 MG/DL (ref 70–99)
GLUCOSE BLDC GLUCOMTR-MCNC: 215 MG/DL (ref 70–99)
GLUCOSE BLDC GLUCOMTR-MCNC: 222 MG/DL (ref 70–99)
GLUCOSE BLDC GLUCOMTR-MCNC: 227 MG/DL (ref 70–99)
GLUCOSE BLDC GLUCOMTR-MCNC: 239 MG/DL (ref 70–99)
GLUCOSE SERPL-MCNC: 132 MG/DL (ref 65–99)
GLUCOSE SERPL-MCNC: 159 MG/DL (ref 65–99)
GLUCOSE SERPL-MCNC: 170 MG/DL (ref 65–99)
GLUCOSE SERPL-MCNC: 199 MG/DL (ref 65–99)
GLUCOSE SERPL-MCNC: 201 MG/DL (ref 65–99)
GLUCOSE SERPL-MCNC: 213 MG/DL (ref 65–99)
GLUCOSE SERPL-MCNC: 221 MG/DL (ref 65–99)
GLUCOSE SERPL-MCNC: 254 MG/DL (ref 65–99)
GLUCOSE SERPL-MCNC: 273 MG/DL (ref 65–99)
GLUCOSE UR STRIP-MCNC: NEGATIVE MG/DL
HBA1C MFR BLD: 7.6 % (ref 4.8–5.6)
HCO3 BLDA-SCNC: 15.8 MMOL/L (ref 22–26)
HCO3 BLDA-SCNC: 15.9 MMOL/L (ref 22–26)
HCO3 BLDA-SCNC: 19.3 MMOL/L (ref 22–26)
HCT VFR BLD AUTO: 24.8 % (ref 37.5–51)
HCT VFR BLD AUTO: 25.9 % (ref 37.5–51)
HCT VFR BLD AUTO: 28.4 % (ref 37.5–51)
HCT VFR BLD AUTO: 33.1 % (ref 37.5–51)
HCT VFR BLD AUTO: 36.7 % (ref 37.5–51)
HCT VFR BLD AUTO: 39.7 % (ref 37.5–51)
HDLC SERPL-MCNC: 41 MG/DL (ref 40–60)
HDLC SERPL-MCNC: 48 MG/DL (ref 40–60)
HGB BLD-MCNC: 10.7 G/DL (ref 13–17.7)
HGB BLD-MCNC: 12 G/DL (ref 13–17.7)
HGB BLD-MCNC: 12.5 G/DL (ref 13–17.7)
HGB BLD-MCNC: 8.2 G/DL (ref 13–17.7)
HGB BLD-MCNC: 8.5 G/DL (ref 13–17.7)
HGB BLD-MCNC: 9.2 G/DL (ref 13–17.7)
HGB BLDA-MCNC: 12.4 G/DL (ref 13.8–16.4)
HGB BLDA-MCNC: 13.1 G/DL (ref 13.8–16.4)
HGB BLDA-MCNC: 13.1 G/DL (ref 13.8–16.4)
HOLD SPECIMEN: NORMAL
IMM GRANULOCYTES # BLD AUTO: 0.02 10*3/MM3 (ref 0–0.05)
IMM GRANULOCYTES NFR BLD AUTO: 0.5 % (ref 0–0.5)
INHALED O2 CONCENTRATION: 60 %
INHALED O2 CONCENTRATION: 95 %
IRON 24H UR-MRATE: 18 MCG/DL (ref 59–158)
IRON SATN MFR SERPL: 6 % (ref 20–50)
KETONES UR QL: NEGATIVE
LACTATE BLDA-SCNC: 2.79 MMOL/L (ref 0.5–2)
LACTATE BLDA-SCNC: 2.96 MMOL/L (ref 0.5–2)
LACTATE BLDA-SCNC: ABNORMAL MMOL/L
LDLC SERPL CALC-MCNC: 48 MG/DL (ref 0–100)
LDLC SERPL CALC-MCNC: 95 MG/DL (ref 0–100)
LDLC/HDLC SERPL: 1.15 {RATIO}
LDLC/HDLC SERPL: 1.96 {RATIO}
LEFT ATRIUM VOLUME INDEX: 97.5 ML/M2
LEUKOCYTE EST, POC: NEGATIVE
LYMPHOCYTES # BLD AUTO: 1.46 10*3/MM3 (ref 0.7–3.1)
LYMPHOCYTES NFR BLD AUTO: 34.7 % (ref 19.6–45.3)
Lab: NORMAL
MAGNESIUM SERPL-MCNC: 1.9 MG/DL (ref 1.6–2.4)
MAGNESIUM SERPL-MCNC: 1.9 MG/DL (ref 1.6–2.4)
MAGNESIUM SERPL-MCNC: 2 MG/DL (ref 1.6–2.4)
MAGNESIUM SERPL-MCNC: 2.1 MG/DL (ref 1.6–2.4)
MAGNESIUM SERPL-MCNC: 2.2 MG/DL (ref 1.6–2.4)
MAGNESIUM SERPL-MCNC: 2.3 MG/DL (ref 1.6–2.4)
MAXIMAL PREDICTED HEART RATE: 150 BPM
MCH RBC QN AUTO: 27.7 PG (ref 26.6–33)
MCH RBC QN AUTO: 27.7 PG (ref 26.6–33)
MCH RBC QN AUTO: 27.8 PG (ref 26.6–33)
MCH RBC QN AUTO: 28 PG (ref 26.6–33)
MCH RBC QN AUTO: 28.2 PG (ref 26.6–33)
MCHC RBC AUTO-ENTMCNC: 32.3 G/DL (ref 31.5–35.7)
MCHC RBC AUTO-ENTMCNC: 32.4 G/DL (ref 31.5–35.7)
MCHC RBC AUTO-ENTMCNC: 32.7 G/DL (ref 31.5–35.7)
MCHC RBC AUTO-ENTMCNC: 32.8 G/DL (ref 31.5–35.7)
MCHC RBC AUTO-ENTMCNC: 33.1 G/DL (ref 31.5–35.7)
MCV RBC AUTO: 83.8 FL (ref 79–97)
MCV RBC AUTO: 84.4 FL (ref 79–97)
MCV RBC AUTO: 85.2 FL (ref 79–97)
MCV RBC AUTO: 86.6 FL (ref 79–97)
MCV RBC AUTO: 87.3 FL (ref 79–97)
METHGB BLD QL: 0.2 % (ref 0–1.5)
METHGB BLD QL: 0.3 % (ref 0–1.5)
METHGB BLD QL: 0.3 % (ref 0–1.5)
MODALITY: ABNORMAL
MONOCYTES # BLD AUTO: 0.47 10*3/MM3 (ref 0.1–0.9)
MONOCYTES NFR BLD AUTO: 11.2 % (ref 5–12)
NEUTROPHILS NFR BLD AUTO: 2.22 10*3/MM3 (ref 1.7–7)
NEUTROPHILS NFR BLD AUTO: 52.6 % (ref 42.7–76)
NITRITE UR-MCNC: NEGATIVE MG/ML
NOTE: ABNORMAL
NRBC BLD AUTO-RTO: 0 /100 WBC (ref 0–0.2)
NT-PROBNP SERPL-MCNC: 6603 PG/ML (ref 0–900)
OXYHGB MFR BLDV: 89.2 % (ref 94–99)
OXYHGB MFR BLDV: 98.1 % (ref 94–99)
OXYHGB MFR BLDV: 98.6 % (ref 94–99)
PCO2 BLDA: 31.5 MM HG (ref 35–45)
PCO2 BLDA: 32.9 MM HG (ref 35–45)
PCO2 BLDA: 44 MM HG (ref 35–45)
PEEP RESPIRATORY: 7 CM[H2O]
PH BLDA: 7.26 PH UNITS (ref 7.35–7.45)
PH BLDA: 7.3 PH UNITS (ref 7.35–7.45)
PH BLDA: 7.32 PH UNITS (ref 7.35–7.45)
PH UR: 5 [PH] (ref 5–8)
PHOSPHATE SERPL-MCNC: 2.9 MG/DL (ref 2.5–4.5)
PHOSPHATE SERPL-MCNC: 2.9 MG/DL (ref 2.5–4.5)
PHOSPHATE SERPL-MCNC: 3.5 MG/DL (ref 2.5–4.5)
PHOSPHATE SERPL-MCNC: 3.5 MG/DL (ref 2.5–4.5)
PHOSPHATE SERPL-MCNC: 4.3 MG/DL (ref 2.5–4.5)
PISA ALIASING VEL: 35.1 M/S
PISA RADIUS: 0.6 CM
PLATELET # BLD AUTO: 141 10*3/MM3 (ref 140–450)
PLATELET # BLD AUTO: 145 10*3/MM3 (ref 140–450)
PLATELET # BLD AUTO: 165 10*3/MM3 (ref 140–450)
PLATELET # BLD AUTO: 187 10*3/MM3 (ref 140–450)
PLATELET # BLD AUTO: 191 10*3/MM3 (ref 140–450)
PMV BLD AUTO: 10.2 FL (ref 6–12)
PMV BLD AUTO: 10.2 FL (ref 6–12)
PMV BLD AUTO: 10.4 FL (ref 6–12)
PMV BLD AUTO: 9.8 FL (ref 6–12)
PMV BLD AUTO: 9.9 FL (ref 6–12)
PO2 BLD: 313 MM[HG] (ref 0–500)
PO2 BLD: 361 MM[HG] (ref 0–500)
PO2 BLDA: 216.6 MM HG (ref 80–100)
PO2 BLDA: 297.6 MM HG (ref 80–100)
PO2 BLDA: 62.8 MM HG (ref 80–100)
POTASSIUM BLDA-SCNC: 5.22 MMOL/L (ref 3.5–5)
POTASSIUM SERPL-SCNC: 2.9 MMOL/L (ref 3.5–5.2)
POTASSIUM SERPL-SCNC: 3.6 MMOL/L (ref 3.5–5.2)
POTASSIUM SERPL-SCNC: 3.7 MMOL/L (ref 3.5–5.2)
POTASSIUM SERPL-SCNC: 3.9 MMOL/L (ref 3.5–5.2)
POTASSIUM SERPL-SCNC: 4.3 MMOL/L (ref 3.5–5.2)
POTASSIUM SERPL-SCNC: 4.3 MMOL/L (ref 3.5–5.2)
POTASSIUM SERPL-SCNC: 4.7 MMOL/L (ref 3.5–5.2)
POTASSIUM SERPL-SCNC: 5.3 MMOL/L (ref 3.5–5.2)
POTASSIUM SERPL-SCNC: 5.3 MMOL/L (ref 3.5–5.2)
PROT SERPL-MCNC: 5.1 G/DL (ref 6–8.5)
PROT SERPL-MCNC: 5.1 G/DL (ref 6–8.5)
PROT SERPL-MCNC: 5.7 G/DL (ref 6–8.5)
PROT SERPL-MCNC: 5.8 G/DL (ref 6–8.5)
PROT SERPL-MCNC: 6.1 G/DL (ref 6–8.5)
PROT SERPL-MCNC: 6.8 G/DL (ref 6–8.5)
PROT UR STRIP-MCNC: NEGATIVE MG/DL
QT INTERVAL: 375 MS
QT INTERVAL: 382 MS
QT INTERVAL: 402 MS
RBC # BLD AUTO: 2.96 10*6/MM3 (ref 4.14–5.8)
RBC # BLD AUTO: 3.07 10*6/MM3 (ref 4.14–5.8)
RBC # BLD AUTO: 3.28 10*6/MM3 (ref 4.14–5.8)
RBC # BLD AUTO: 3.79 10*6/MM3 (ref 4.14–5.8)
RBC # BLD AUTO: 4.31 10*6/MM3 (ref 4.14–5.8)
RBC # UR STRIP: NEGATIVE /UL
SAO2 % BLDCOA: 89.6 % (ref 95–99)
SAO2 % BLDCOA: 98.7 % (ref 95–99)
SAO2 % BLDCOA: 99 % (ref 95–99)
SODIUM BLDA-SCNC: 136.6 MMOL/L (ref 136–146)
SODIUM SERPL-SCNC: 135 MMOL/L (ref 136–145)
SODIUM SERPL-SCNC: 136 MMOL/L (ref 136–145)
SODIUM SERPL-SCNC: 137 MMOL/L (ref 136–145)
SODIUM SERPL-SCNC: 138 MMOL/L (ref 136–145)
SODIUM SERPL-SCNC: 139 MMOL/L (ref 136–145)
SODIUM SERPL-SCNC: 139 MMOL/L (ref 136–145)
SODIUM SERPL-SCNC: 140 MMOL/L (ref 136–145)
SP GR UR: 1.02 (ref 1–1.03)
STRESS TARGET HR: 128 BPM
TIBC SERPL-MCNC: 323 MCG/DL (ref 298–536)
TRANSFERRIN SERPL-MCNC: 217 MG/DL (ref 200–360)
TRIGL SERPL-MCNC: 90 MG/DL (ref 0–150)
TRIGL SERPL-MCNC: 99 MG/DL (ref 0–150)
TROPONIN T DELTA: ABNORMAL
TROPONIN T SERPL HS-MCNC: ABNORMAL NG/L
UROBILINOGEN UR QL: NORMAL
VENTILATOR MODE: ABNORMAL
VLDLC SERPL-MCNC: 17 MG/DL (ref 5–40)
VLDLC SERPL-MCNC: 19 MG/DL (ref 5–40)
WBC NRBC COR # BLD: 14.5 10*3/MM3 (ref 3.4–10.8)
WBC NRBC COR # BLD: 15.37 10*3/MM3 (ref 3.4–10.8)
WBC NRBC COR # BLD: 17.23 10*3/MM3 (ref 3.4–10.8)
WBC NRBC COR # BLD: 17.58 10*3/MM3 (ref 3.4–10.8)
WBC NRBC COR # BLD: 4.21 10*3/MM3 (ref 3.4–10.8)
WHOLE BLOOD HOLD SPECIMEN: NORMAL

## 2023-01-01 PROCEDURE — 80053 COMPREHEN METABOLIC PANEL: CPT | Performed by: PHYSICIAN ASSISTANT

## 2023-01-01 PROCEDURE — C1769 GUIDE WIRE: HCPCS | Performed by: INTERNAL MEDICINE

## 2023-01-01 PROCEDURE — B2151ZZ FLUOROSCOPY OF LEFT HEART USING LOW OSMOLAR CONTRAST: ICD-10-PCS | Performed by: INTERNAL MEDICINE

## 2023-01-01 PROCEDURE — 93010 ELECTROCARDIOGRAM REPORT: CPT | Performed by: INTERNAL MEDICINE

## 2023-01-01 PROCEDURE — 25010000002 MIDAZOLAM PER 1 MG: Performed by: INTERNAL MEDICINE

## 2023-01-01 PROCEDURE — 25010000002 DOBUTAMINE PER 250 MG: Performed by: PHYSICIAN ASSISTANT

## 2023-01-01 PROCEDURE — 93458 L HRT ARTERY/VENTRICLE ANGIO: CPT | Performed by: INTERNAL MEDICINE

## 2023-01-01 PROCEDURE — 99152 MOD SED SAME PHYS/QHP 5/>YRS: CPT | Performed by: INTERNAL MEDICINE

## 2023-01-01 PROCEDURE — 83050 HGB METHEMOGLOBIN QUAN: CPT | Performed by: STUDENT IN AN ORGANIZED HEALTH CARE EDUCATION/TRAINING PROGRAM

## 2023-01-01 PROCEDURE — 94003 VENT MGMT INPAT SUBQ DAY: CPT

## 2023-01-01 PROCEDURE — 71045 X-RAY EXAM CHEST 1 VIEW: CPT

## 2023-01-01 PROCEDURE — 99291 CRITICAL CARE FIRST HOUR: CPT | Performed by: STUDENT IN AN ORGANIZED HEALTH CARE EDUCATION/TRAINING PROGRAM

## 2023-01-01 PROCEDURE — C1887 CATHETER, GUIDING: HCPCS | Performed by: INTERNAL MEDICINE

## 2023-01-01 PROCEDURE — 83735 ASSAY OF MAGNESIUM: CPT | Performed by: PHYSICIAN ASSISTANT

## 2023-01-01 PROCEDURE — 82375 ASSAY CARBOXYHB QUANT: CPT | Performed by: STUDENT IN AN ORGANIZED HEALTH CARE EDUCATION/TRAINING PROGRAM

## 2023-01-01 PROCEDURE — 03HY32Z INSERTION OF MONITORING DEVICE INTO UPPER ARTERY, PERCUTANEOUS APPROACH: ICD-10-PCS | Performed by: STUDENT IN AN ORGANIZED HEALTH CARE EDUCATION/TRAINING PROGRAM

## 2023-01-01 PROCEDURE — 31500 INSERT EMERGENCY AIRWAY: CPT | Performed by: STUDENT IN AN ORGANIZED HEALTH CARE EDUCATION/TRAINING PROGRAM

## 2023-01-01 PROCEDURE — C1753 CATH, INTRAVAS ULTRASOUND: HCPCS | Performed by: INTERNAL MEDICINE

## 2023-01-01 PROCEDURE — 99232 SBSQ HOSP IP/OBS MODERATE 35: CPT | Performed by: INTERNAL MEDICINE

## 2023-01-01 PROCEDURE — C1725 CATH, TRANSLUMIN NON-LASER: HCPCS | Performed by: INTERNAL MEDICINE

## 2023-01-01 PROCEDURE — 80069 RENAL FUNCTION PANEL: CPT | Performed by: PHYSICIAN ASSISTANT

## 2023-01-01 PROCEDURE — 94799 UNLISTED PULMONARY SVC/PX: CPT

## 2023-01-01 PROCEDURE — 80053 COMPREHEN METABOLIC PANEL: CPT

## 2023-01-01 PROCEDURE — 83605 ASSAY OF LACTIC ACID: CPT | Performed by: NURSE PRACTITIONER

## 2023-01-01 PROCEDURE — 25010000002 HEPARIN (PORCINE) PER 1000 UNITS: Performed by: STUDENT IN AN ORGANIZED HEALTH CARE EDUCATION/TRAINING PROGRAM

## 2023-01-01 PROCEDURE — 0 MILRINONE LACTATE IN DEXTROSE 20-5 MG/100ML-% SOLUTION: Performed by: STUDENT IN AN ORGANIZED HEALTH CARE EDUCATION/TRAINING PROGRAM

## 2023-01-01 PROCEDURE — 25010000002 HEPARIN (PORCINE) PER 1000 UNITS: Performed by: INTERNAL MEDICINE

## 2023-01-01 PROCEDURE — 25010000002 FUROSEMIDE PER 20 MG: Performed by: STUDENT IN AN ORGANIZED HEALTH CARE EDUCATION/TRAINING PROGRAM

## 2023-01-01 PROCEDURE — 80061 LIPID PANEL: CPT | Performed by: INTERNAL MEDICINE

## 2023-01-01 PROCEDURE — 25010000002 MORPHINE PER 10 MG: Performed by: STUDENT IN AN ORGANIZED HEALTH CARE EDUCATION/TRAINING PROGRAM

## 2023-01-01 PROCEDURE — 25010000002 MORPHINE PER 10 MG: Performed by: INTERNAL MEDICINE

## 2023-01-01 PROCEDURE — 93005 ELECTROCARDIOGRAM TRACING: CPT | Performed by: INTERNAL MEDICINE

## 2023-01-01 PROCEDURE — 25010000002 DOBUTAMINE PER 250 MG: Performed by: STUDENT IN AN ORGANIZED HEALTH CARE EDUCATION/TRAINING PROGRAM

## 2023-01-01 PROCEDURE — 25010000002 DOBUTAMINE PER 250 MG: Performed by: INTERNAL MEDICINE

## 2023-01-01 PROCEDURE — 92978 ENDOLUMINL IVUS OCT C 1ST: CPT | Performed by: INTERNAL MEDICINE

## 2023-01-01 PROCEDURE — 84100 ASSAY OF PHOSPHORUS: CPT | Performed by: PHYSICIAN ASSISTANT

## 2023-01-01 PROCEDURE — 94002 VENT MGMT INPAT INIT DAY: CPT

## 2023-01-01 PROCEDURE — 82962 GLUCOSE BLOOD TEST: CPT

## 2023-01-01 PROCEDURE — 82805 BLOOD GASES W/O2 SATURATION: CPT | Performed by: INTERNAL MEDICINE

## 2023-01-01 PROCEDURE — 25010000002 PROPOFOL 10 MG/ML EMULSION: Performed by: STUDENT IN AN ORGANIZED HEALTH CARE EDUCATION/TRAINING PROGRAM

## 2023-01-01 PROCEDURE — 81003 URINALYSIS AUTO W/O SCOPE: CPT | Performed by: NURSE PRACTITIONER

## 2023-01-01 PROCEDURE — 25010000002 FUROSEMIDE PER 20 MG: Performed by: NURSE PRACTITIONER

## 2023-01-01 PROCEDURE — 02HV33Z INSERTION OF INFUSION DEVICE INTO SUPERIOR VENA CAVA, PERCUTANEOUS APPROACH: ICD-10-PCS | Performed by: STUDENT IN AN ORGANIZED HEALTH CARE EDUCATION/TRAINING PROGRAM

## 2023-01-01 PROCEDURE — C1874 STENT, COATED/COV W/DEL SYS: HCPCS | Performed by: INTERNAL MEDICINE

## 2023-01-01 PROCEDURE — 36620 INSERTION CATHETER ARTERY: CPT | Performed by: STUDENT IN AN ORGANIZED HEALTH CARE EDUCATION/TRAINING PROGRAM

## 2023-01-01 PROCEDURE — 85018 HEMOGLOBIN: CPT | Performed by: INTERNAL MEDICINE

## 2023-01-01 PROCEDURE — 63710000001 INSULIN DETEMIR PER 5 UNITS: Performed by: PHYSICIAN ASSISTANT

## 2023-01-01 PROCEDURE — 84484 ASSAY OF TROPONIN QUANT: CPT | Performed by: INTERNAL MEDICINE

## 2023-01-01 PROCEDURE — 83540 ASSAY OF IRON: CPT | Performed by: PHYSICIAN ASSISTANT

## 2023-01-01 PROCEDURE — 0 POTASSIUM CHLORIDE PER 2 MEQ: Performed by: PHYSICIAN ASSISTANT

## 2023-01-01 PROCEDURE — 25510000001 IOPAMIDOL PER 1 ML: Performed by: INTERNAL MEDICINE

## 2023-01-01 PROCEDURE — C1757 CATH, THROMBECTOMY/EMBOLECT: HCPCS | Performed by: INTERNAL MEDICINE

## 2023-01-01 PROCEDURE — 82805 BLOOD GASES W/O2 SATURATION: CPT | Performed by: STUDENT IN AN ORGANIZED HEALTH CARE EDUCATION/TRAINING PROGRAM

## 2023-01-01 PROCEDURE — 85027 COMPLETE CBC AUTOMATED: CPT | Performed by: PHYSICIAN ASSISTANT

## 2023-01-01 PROCEDURE — 92920 PRQ TRLUML C ANGIOP 1ART&/BR: CPT | Performed by: INTERNAL MEDICINE

## 2023-01-01 PROCEDURE — 83605 ASSAY OF LACTIC ACID: CPT | Performed by: PHYSICIAN ASSISTANT

## 2023-01-01 PROCEDURE — C1894 INTRO/SHEATH, NON-LASER: HCPCS | Performed by: INTERNAL MEDICINE

## 2023-01-01 PROCEDURE — 02HA3RZ INSERTION OF SHORT-TERM EXTERNAL HEART ASSIST SYSTEM INTO HEART, PERCUTANEOUS APPROACH: ICD-10-PCS | Performed by: INTERNAL MEDICINE

## 2023-01-01 PROCEDURE — 63710000001 INSULIN DETEMIR PER 5 UNITS: Performed by: STUDENT IN AN ORGANIZED HEALTH CARE EDUCATION/TRAINING PROGRAM

## 2023-01-01 PROCEDURE — 92941 PRQ TRLML REVSC TOT OCCL AMI: CPT | Performed by: INTERNAL MEDICINE

## 2023-01-01 PROCEDURE — 5A1223Z PERFORMANCE OF CARDIAC PACING, CONTINUOUS: ICD-10-PCS | Performed by: INTERNAL MEDICINE

## 2023-01-01 PROCEDURE — 85027 COMPLETE CBC AUTOMATED: CPT | Performed by: NURSE PRACTITIONER

## 2023-01-01 PROCEDURE — 82375 ASSAY CARBOXYHB QUANT: CPT | Performed by: INTERNAL MEDICINE

## 2023-01-01 PROCEDURE — 80048 BASIC METABOLIC PNL TOTAL CA: CPT | Performed by: INTERNAL MEDICINE

## 2023-01-01 PROCEDURE — 99233 SBSQ HOSP IP/OBS HIGH 50: CPT | Performed by: INTERNAL MEDICINE

## 2023-01-01 PROCEDURE — 5A0221D ASSISTANCE WITH CARDIAC OUTPUT USING IMPELLER PUMP, CONTINUOUS: ICD-10-PCS | Performed by: INTERNAL MEDICINE

## 2023-01-01 PROCEDURE — 25010000002 NA FERRIC GLUC CPLX PER 12.5 MG: Performed by: PHYSICIAN ASSISTANT

## 2023-01-01 PROCEDURE — 02C03ZZ EXTIRPATION OF MATTER FROM CORONARY ARTERY, ONE ARTERY, PERCUTANEOUS APPROACH: ICD-10-PCS | Performed by: INTERNAL MEDICINE

## 2023-01-01 PROCEDURE — 5A1935Z RESPIRATORY VENTILATION, LESS THAN 24 CONSECUTIVE HOURS: ICD-10-PCS | Performed by: STUDENT IN AN ORGANIZED HEALTH CARE EDUCATION/TRAINING PROGRAM

## 2023-01-01 PROCEDURE — C9606 PERC D-E COR REVASC W AMI S: HCPCS | Performed by: INTERNAL MEDICINE

## 2023-01-01 PROCEDURE — 63710000001 INSULIN LISPRO (HUMAN) PER 5 UNITS: Performed by: STUDENT IN AN ORGANIZED HEALTH CARE EDUCATION/TRAINING PROGRAM

## 2023-01-01 PROCEDURE — 85027 COMPLETE CBC AUTOMATED: CPT | Performed by: INTERNAL MEDICINE

## 2023-01-01 PROCEDURE — 83880 ASSAY OF NATRIURETIC PEPTIDE: CPT | Performed by: NURSE PRACTITIONER

## 2023-01-01 PROCEDURE — 85347 COAGULATION TIME ACTIVATED: CPT

## 2023-01-01 PROCEDURE — 83050 HGB METHEMOGLOBIN QUAN: CPT | Performed by: INTERNAL MEDICINE

## 2023-01-01 PROCEDURE — 99203 OFFICE O/P NEW LOW 30 MIN: CPT | Performed by: NURSE PRACTITIONER

## 2023-01-01 PROCEDURE — 027136Z DILATION OF CORONARY ARTERY, TWO ARTERIES WITH THREE DRUG-ELUTING INTRALUMINAL DEVICES, PERCUTANEOUS APPROACH: ICD-10-PCS | Performed by: INTERNAL MEDICINE

## 2023-01-01 PROCEDURE — 83735 ASSAY OF MAGNESIUM: CPT | Performed by: NURSE PRACTITIONER

## 2023-01-01 PROCEDURE — 85025 COMPLETE CBC W/AUTO DIFF WBC: CPT | Performed by: INTERNAL MEDICINE

## 2023-01-01 PROCEDURE — 99239 HOSP IP/OBS DSCHRG MGMT >30: CPT | Performed by: INTERNAL MEDICINE

## 2023-01-01 PROCEDURE — 84100 ASSAY OF PHOSPHORUS: CPT | Performed by: INTERNAL MEDICINE

## 2023-01-01 PROCEDURE — B2111ZZ FLUOROSCOPY OF MULTIPLE CORONARY ARTERIES USING LOW OSMOLAR CONTRAST: ICD-10-PCS | Performed by: INTERNAL MEDICINE

## 2023-01-01 PROCEDURE — 93306 TTE W/DOPPLER COMPLETE: CPT

## 2023-01-01 PROCEDURE — 25010000002 FENTANYL CITRATE (PF) 50 MCG/ML SOLUTION: Performed by: INTERNAL MEDICINE

## 2023-01-01 PROCEDURE — 99223 1ST HOSP IP/OBS HIGH 75: CPT | Performed by: INTERNAL MEDICINE

## 2023-01-01 PROCEDURE — 80053 COMPREHEN METABOLIC PANEL: CPT | Performed by: NURSE PRACTITIONER

## 2023-01-01 PROCEDURE — 83735 ASSAY OF MAGNESIUM: CPT | Performed by: INTERNAL MEDICINE

## 2023-01-01 PROCEDURE — 80061 LIPID PANEL: CPT

## 2023-01-01 PROCEDURE — 0BH17EZ INSERTION OF ENDOTRACHEAL AIRWAY INTO TRACHEA, VIA NATURAL OR ARTIFICIAL OPENING: ICD-10-PCS | Performed by: STUDENT IN AN ORGANIZED HEALTH CARE EDUCATION/TRAINING PROGRAM

## 2023-01-01 PROCEDURE — 83036 HEMOGLOBIN GLYCOSYLATED A1C: CPT | Performed by: INTERNAL MEDICINE

## 2023-01-01 PROCEDURE — 25010000002 PROPOFOL 10 MG/ML EMULSION: Performed by: INTERNAL MEDICINE

## 2023-01-01 PROCEDURE — 36600 WITHDRAWAL OF ARTERIAL BLOOD: CPT | Performed by: STUDENT IN AN ORGANIZED HEALTH CARE EDUCATION/TRAINING PROGRAM

## 2023-01-01 PROCEDURE — 33997 RMVL PERQ RIGHT HEART VAD: CPT | Performed by: INTERNAL MEDICINE

## 2023-01-01 PROCEDURE — 25010000002 CANGRELOR TETRASODIUM 50 MG RECONSTITUTED SOLUTION 1 EACH VIAL: Performed by: INTERNAL MEDICINE

## 2023-01-01 PROCEDURE — 76937 US GUIDE VASCULAR ACCESS: CPT | Performed by: STUDENT IN AN ORGANIZED HEALTH CARE EDUCATION/TRAINING PROGRAM

## 2023-01-01 PROCEDURE — 36556 INSERT NON-TUNNEL CV CATH: CPT | Performed by: STUDENT IN AN ORGANIZED HEALTH CARE EDUCATION/TRAINING PROGRAM

## 2023-01-01 PROCEDURE — 80053 COMPREHEN METABOLIC PANEL: CPT | Performed by: INTERNAL MEDICINE

## 2023-01-01 PROCEDURE — 99214 OFFICE O/P EST MOD 30 MIN: CPT | Performed by: FAMILY MEDICINE

## 2023-01-01 PROCEDURE — 25010000002 ONDANSETRON PER 1 MG: Performed by: INTERNAL MEDICINE

## 2023-01-01 PROCEDURE — 85014 HEMATOCRIT: CPT | Performed by: INTERNAL MEDICINE

## 2023-01-01 PROCEDURE — C9460 INJECTION, CANGRELOR: HCPCS | Performed by: INTERNAL MEDICINE

## 2023-01-01 PROCEDURE — 25010000002 FUROSEMIDE PER 20 MG: Performed by: INTERNAL MEDICINE

## 2023-01-01 PROCEDURE — 33990 INSJ PERQ VAD L HRT ARTERIAL: CPT | Performed by: INTERNAL MEDICINE

## 2023-01-01 PROCEDURE — 92973 PRQ TRLUML C MCHN ASP THRMBC: CPT | Performed by: INTERNAL MEDICINE

## 2023-01-01 PROCEDURE — 4A023N7 MEASUREMENT OF CARDIAC SAMPLING AND PRESSURE, LEFT HEART, PERCUTANEOUS APPROACH: ICD-10-PCS | Performed by: INTERNAL MEDICINE

## 2023-01-01 PROCEDURE — B240ZZ3 ULTRASONOGRAPHY OF SINGLE CORONARY ARTERY, INTRAVASCULAR: ICD-10-PCS | Performed by: INTERNAL MEDICINE

## 2023-01-01 PROCEDURE — 93306 TTE W/DOPPLER COMPLETE: CPT | Performed by: INTERNAL MEDICINE

## 2023-01-01 PROCEDURE — 84466 ASSAY OF TRANSFERRIN: CPT | Performed by: PHYSICIAN ASSISTANT

## 2023-01-01 PROCEDURE — B4101ZZ FLUOROSCOPY OF ABDOMINAL AORTA USING LOW OSMOLAR CONTRAST: ICD-10-PCS | Performed by: INTERNAL MEDICINE

## 2023-01-01 PROCEDURE — 36415 COLL VENOUS BLD VENIPUNCTURE: CPT

## 2023-01-01 PROCEDURE — 99153 MOD SED SAME PHYS/QHP EA: CPT | Performed by: INTERNAL MEDICINE

## 2023-01-01 DEVICE — XIENCE SKYPOINT™ EVEROLIMUS ELUTING CORONARY STENT SYSTEM 2.50 MM X 15 MM / RAPID-EXCHANGE
Type: IMPLANTABLE DEVICE | Status: FUNCTIONAL
Brand: XIENCE SKYPOINT™

## 2023-01-01 DEVICE — XIENCE SKYPOINT™ EVEROLIMUS ELUTING CORONARY STENT SYSTEM 3.25 MM X 18 MM / RAPID-EXCHANGE
Type: IMPLANTABLE DEVICE | Status: FUNCTIONAL
Brand: XIENCE SKYPOINT™

## 2023-01-01 DEVICE — XIENCE SKYPOINT™ EVEROLIMUS ELUTING CORONARY STENT SYSTEM 4.00 MM X 28 MM / RAPID-EXCHANGE
Type: IMPLANTABLE DEVICE | Status: FUNCTIONAL
Brand: XIENCE SKYPOINT™

## 2023-01-01 RX ORDER — ONDANSETRON 4 MG/1
4 TABLET, FILM COATED ORAL EVERY 6 HOURS PRN
Status: DISCONTINUED | OUTPATIENT
Start: 2023-01-01 | End: 2023-01-01

## 2023-01-01 RX ORDER — VERAPAMIL HYDROCHLORIDE 2.5 MG/ML
INJECTION, SOLUTION INTRAVENOUS
Status: DISCONTINUED | OUTPATIENT
Start: 2023-01-01 | End: 2023-01-01 | Stop reason: HOSPADM

## 2023-01-01 RX ORDER — SODIUM CHLORIDE 0.9 % (FLUSH) 0.9 %
10 SYRINGE (ML) INJECTION EVERY 12 HOURS SCHEDULED
Status: DISCONTINUED | OUTPATIENT
Start: 2023-01-01 | End: 2023-01-01 | Stop reason: HOSPADM

## 2023-01-01 RX ORDER — MORPHINE SULFATE 2 MG/ML
1 INJECTION, SOLUTION INTRAMUSCULAR; INTRAVENOUS EVERY 4 HOURS PRN
Status: DISCONTINUED | OUTPATIENT
Start: 2023-01-01 | End: 2023-01-01

## 2023-01-01 RX ORDER — FUROSEMIDE 10 MG/ML
60 INJECTION INTRAMUSCULAR; INTRAVENOUS EVERY 8 HOURS
Status: DISCONTINUED | OUTPATIENT
Start: 2023-01-01 | End: 2023-01-01 | Stop reason: HOSPADM

## 2023-01-01 RX ORDER — DOBUTAMINE HYDROCHLORIDE 200 MG/100ML
5 INJECTION INTRAVENOUS CONTINUOUS
Status: DISCONTINUED | OUTPATIENT
Start: 2023-01-01 | End: 2023-01-01 | Stop reason: HOSPADM

## 2023-01-01 RX ORDER — ONDANSETRON 2 MG/ML
4 INJECTION INTRAMUSCULAR; INTRAVENOUS EVERY 6 HOURS PRN
Status: DISCONTINUED | OUTPATIENT
Start: 2023-01-01 | End: 2023-01-01

## 2023-01-01 RX ORDER — DOBUTAMINE HYDROCHLORIDE 200 MG/100ML
5 INJECTION INTRAVENOUS
Status: DISCONTINUED | OUTPATIENT
Start: 2023-01-01 | End: 2023-01-01

## 2023-01-01 RX ORDER — NOREPINEPHRINE BIT/0.9 % NACL 8 MG/250ML
.02-.3 INFUSION BOTTLE (ML) INTRAVENOUS
Status: DISCONTINUED | OUTPATIENT
Start: 2023-01-01 | End: 2023-01-01

## 2023-01-01 RX ORDER — AMOXICILLIN 250 MG
2 CAPSULE ORAL 2 TIMES DAILY
Status: DISCONTINUED | OUTPATIENT
Start: 2023-01-01 | End: 2023-01-01 | Stop reason: HOSPADM

## 2023-01-01 RX ORDER — POTASSIUM CHLORIDE 29.8 MG/ML
20 INJECTION INTRAVENOUS
Status: COMPLETED | OUTPATIENT
Start: 2023-01-01 | End: 2023-01-01

## 2023-01-01 RX ORDER — BISACODYL 5 MG/1
5 TABLET, DELAYED RELEASE ORAL DAILY PRN
Status: DISCONTINUED | OUTPATIENT
Start: 2023-01-01 | End: 2023-01-01 | Stop reason: HOSPADM

## 2023-01-01 RX ORDER — FENTANYL CITRATE 50 UG/ML
INJECTION, SOLUTION INTRAMUSCULAR; INTRAVENOUS
Status: DISCONTINUED | OUTPATIENT
Start: 2023-01-01 | End: 2023-01-01 | Stop reason: HOSPADM

## 2023-01-01 RX ORDER — ROSUVASTATIN CALCIUM 20 MG/1
20 TABLET, COATED ORAL DAILY
Qty: 90 TABLET | Refills: 1 | Status: SHIPPED | OUTPATIENT
Start: 2023-01-01

## 2023-01-01 RX ORDER — MILRINONE LACTATE 0.2 MG/ML
0.25 INJECTION, SOLUTION INTRAVENOUS
Status: DISCONTINUED | OUTPATIENT
Start: 2023-01-01 | End: 2023-01-01

## 2023-01-01 RX ORDER — BISACODYL 10 MG
10 SUPPOSITORY, RECTAL RECTAL DAILY PRN
Status: DISCONTINUED | OUTPATIENT
Start: 2023-01-01 | End: 2023-01-01 | Stop reason: HOSPADM

## 2023-01-01 RX ORDER — HEPARIN SODIUM 1000 [USP'U]/ML
INJECTION, SOLUTION INTRAVENOUS; SUBCUTANEOUS
Status: DISCONTINUED | OUTPATIENT
Start: 2023-01-01 | End: 2023-01-01 | Stop reason: HOSPADM

## 2023-01-01 RX ORDER — MIDAZOLAM HYDROCHLORIDE 1 MG/ML
INJECTION INTRAMUSCULAR; INTRAVENOUS
Status: DISCONTINUED | OUTPATIENT
Start: 2023-01-01 | End: 2023-01-01 | Stop reason: HOSPADM

## 2023-01-01 RX ORDER — ROSUVASTATIN CALCIUM 20 MG/1
40 TABLET, COATED ORAL NIGHTLY
Status: DISCONTINUED | OUTPATIENT
Start: 2023-01-01 | End: 2023-01-01

## 2023-01-01 RX ORDER — FUROSEMIDE 10 MG/ML
40 INJECTION INTRAMUSCULAR; INTRAVENOUS EVERY 12 HOURS
Status: DISCONTINUED | OUTPATIENT
Start: 2023-01-01 | End: 2023-01-01

## 2023-01-01 RX ORDER — MORPHINE SULFATE 2 MG/ML
1 INJECTION, SOLUTION INTRAMUSCULAR; INTRAVENOUS
Status: DISCONTINUED | OUTPATIENT
Start: 2023-01-01 | End: 2023-01-01 | Stop reason: HOSPADM

## 2023-01-01 RX ORDER — ASPIRIN 81 MG/1
81 TABLET ORAL DAILY
Status: DISCONTINUED | OUTPATIENT
Start: 2023-01-01 | End: 2023-01-01 | Stop reason: HOSPADM

## 2023-01-01 RX ORDER — NICOTINE POLACRILEX 4 MG
15 LOZENGE BUCCAL
Status: DISCONTINUED | OUTPATIENT
Start: 2023-01-01 | End: 2023-01-01 | Stop reason: HOSPADM

## 2023-01-01 RX ORDER — ROCURONIUM BROMIDE 10 MG/ML
1000 INJECTION, SOLUTION INTRAVENOUS ONCE
Status: DISCONTINUED | OUTPATIENT
Start: 2023-01-01 | End: 2023-01-01

## 2023-01-01 RX ORDER — ASPIRIN 81 MG/1
81 TABLET ORAL DAILY
Status: DISCONTINUED | OUTPATIENT
Start: 2023-01-01 | End: 2023-01-01

## 2023-01-01 RX ORDER — ONDANSETRON 4 MG/1
4 TABLET, FILM COATED ORAL EVERY 6 HOURS PRN
Status: DISCONTINUED | OUTPATIENT
Start: 2023-01-01 | End: 2023-01-01 | Stop reason: HOSPADM

## 2023-01-01 RX ORDER — TAMSULOSIN HYDROCHLORIDE 0.4 MG/1
1 CAPSULE ORAL DAILY
Qty: 90 CAPSULE | Refills: 1 | Status: SHIPPED | OUTPATIENT
Start: 2023-01-01

## 2023-01-01 RX ORDER — FUROSEMIDE 10 MG/ML
20 INJECTION INTRAMUSCULAR; INTRAVENOUS ONCE
Status: COMPLETED | OUTPATIENT
Start: 2023-01-01 | End: 2023-01-01

## 2023-01-01 RX ORDER — ASPIRIN 81 MG/1
TABLET, CHEWABLE ORAL
Status: DISPENSED
Start: 2023-01-01 | End: 2023-01-01

## 2023-01-01 RX ORDER — ONDANSETRON 2 MG/ML
4 INJECTION INTRAMUSCULAR; INTRAVENOUS EVERY 6 HOURS PRN
Status: DISCONTINUED | OUTPATIENT
Start: 2023-01-01 | End: 2023-01-01 | Stop reason: HOSPADM

## 2023-01-01 RX ORDER — NICARDIPINE HYDROCHLORIDE 2.5 MG/ML
INJECTION INTRAVENOUS
Status: DISCONTINUED | OUTPATIENT
Start: 2023-01-01 | End: 2023-01-01 | Stop reason: HOSPADM

## 2023-01-01 RX ORDER — POTASSIUM CHLORIDE 750 MG/1
20 CAPSULE, EXTENDED RELEASE ORAL DAILY
Status: DISCONTINUED | OUTPATIENT
Start: 2023-01-01 | End: 2023-01-01 | Stop reason: HOSPADM

## 2023-01-01 RX ORDER — DEXTROSE MONOHYDRATE 25 G/50ML
25 INJECTION, SOLUTION INTRAVENOUS
Status: DISCONTINUED | OUTPATIENT
Start: 2023-01-01 | End: 2023-01-01 | Stop reason: HOSPADM

## 2023-01-01 RX ORDER — ROSUVASTATIN CALCIUM 20 MG/1
40 TABLET, COATED ORAL NIGHTLY
Status: DISCONTINUED | OUTPATIENT
Start: 2023-01-01 | End: 2023-01-01 | Stop reason: HOSPADM

## 2023-01-01 RX ORDER — POLYETHYLENE GLYCOL 3350 17 G/17G
17 POWDER, FOR SOLUTION ORAL DAILY PRN
Status: DISCONTINUED | OUTPATIENT
Start: 2023-01-01 | End: 2023-01-01 | Stop reason: HOSPADM

## 2023-01-01 RX ORDER — LIDOCAINE HYDROCHLORIDE 20 MG/ML
INJECTION, SOLUTION INFILTRATION; PERINEURAL
Status: DISCONTINUED | OUTPATIENT
Start: 2023-01-01 | End: 2023-01-01 | Stop reason: HOSPADM

## 2023-01-01 RX ORDER — BUPIVACAINE HCL/0.9 % NACL/PF 0.1 %
2 PLASTIC BAG, INJECTION (ML) EPIDURAL ONCE
Status: COMPLETED | OUTPATIENT
Start: 2023-01-01 | End: 2023-01-01

## 2023-01-01 RX ORDER — ASPIRIN 81 MG/1
81 TABLET, CHEWABLE ORAL DAILY
Status: DISCONTINUED | OUTPATIENT
Start: 2023-01-01 | End: 2023-01-01

## 2023-01-01 RX ORDER — NALOXONE HCL 0.4 MG/ML
0.4 VIAL (ML) INJECTION
Status: DISCONTINUED | OUTPATIENT
Start: 2023-01-01 | End: 2023-01-01

## 2023-01-01 RX ORDER — SODIUM CHLORIDE 9 MG/ML
100 INJECTION, SOLUTION INTRAVENOUS CONTINUOUS
Status: CANCELLED | OUTPATIENT
Start: 2023-01-01

## 2023-01-01 RX ORDER — ACETAMINOPHEN 325 MG/1
650 TABLET ORAL EVERY 4 HOURS PRN
Status: DISCONTINUED | OUTPATIENT
Start: 2023-01-01 | End: 2023-01-01

## 2023-01-01 RX ORDER — SODIUM CHLORIDE 0.9 % (FLUSH) 0.9 %
20 SYRINGE (ML) INJECTION AS NEEDED
Status: DISCONTINUED | OUTPATIENT
Start: 2023-01-01 | End: 2023-01-01 | Stop reason: HOSPADM

## 2023-01-01 RX ORDER — FUROSEMIDE 10 MG/ML
60 INJECTION INTRAMUSCULAR; INTRAVENOUS ONCE
Status: COMPLETED | OUTPATIENT
Start: 2023-01-01 | End: 2023-01-01

## 2023-01-01 RX ORDER — NALOXONE HCL 0.4 MG/ML
0.4 VIAL (ML) INJECTION
Status: DISCONTINUED | OUTPATIENT
Start: 2023-01-01 | End: 2023-01-01 | Stop reason: HOSPADM

## 2023-01-01 RX ORDER — CIPROFLOXACIN 500 MG/1
TABLET, FILM COATED ORAL
Qty: 6 TABLET | Refills: 0 | Status: ON HOLD | OUTPATIENT
Start: 2023-01-01 | End: 2023-01-01

## 2023-01-01 RX ORDER — MILRINONE LACTATE 0.2 MG/ML
0.25 INJECTION, SOLUTION INTRAVENOUS CONTINUOUS
Status: DISCONTINUED | OUTPATIENT
Start: 2023-01-01 | End: 2023-01-01 | Stop reason: HOSPADM

## 2023-01-01 RX ORDER — FUROSEMIDE 20 MG/1
40 TABLET ORAL DAILY PRN
Qty: 60 TABLET | Refills: 2 | Status: SHIPPED | OUTPATIENT
Start: 2023-01-01

## 2023-01-01 RX ORDER — ONDANSETRON 2 MG/ML
INJECTION INTRAMUSCULAR; INTRAVENOUS
Status: DISCONTINUED | OUTPATIENT
Start: 2023-01-01 | End: 2023-01-01 | Stop reason: HOSPADM

## 2023-01-01 RX ORDER — SODIUM CHLORIDE 0.9 % (FLUSH) 0.9 %
10 SYRINGE (ML) INJECTION AS NEEDED
Status: DISCONTINUED | OUTPATIENT
Start: 2023-01-01 | End: 2023-01-01 | Stop reason: HOSPADM

## 2023-01-01 RX ORDER — METOLAZONE 5 MG/1
5 TABLET ORAL ONCE
Status: COMPLETED | OUTPATIENT
Start: 2023-01-01 | End: 2023-01-01

## 2023-01-01 RX ORDER — FUROSEMIDE 10 MG/ML
INJECTION INTRAMUSCULAR; INTRAVENOUS
Status: DISCONTINUED | OUTPATIENT
Start: 2023-01-01 | End: 2023-01-01 | Stop reason: HOSPADM

## 2023-01-01 RX ORDER — INSULIN LISPRO 100 [IU]/ML
2-7 INJECTION, SOLUTION INTRAVENOUS; SUBCUTANEOUS
Status: DISCONTINUED | OUTPATIENT
Start: 2023-01-01 | End: 2023-01-01 | Stop reason: HOSPADM

## 2023-01-01 RX ORDER — HEPARIN SODIUM 5000 [USP'U]/ML
5000 INJECTION, SOLUTION INTRAVENOUS; SUBCUTANEOUS EVERY 8 HOURS SCHEDULED
Status: DISCONTINUED | OUTPATIENT
Start: 2023-01-01 | End: 2023-01-01 | Stop reason: HOSPADM

## 2023-01-01 RX ORDER — FUROSEMIDE 10 MG/ML
40 INJECTION INTRAMUSCULAR; INTRAVENOUS EVERY 8 HOURS
Status: DISCONTINUED | OUTPATIENT
Start: 2023-01-01 | End: 2023-01-01

## 2023-01-01 RX ORDER — NOREPINEPHRINE BIT/0.9 % NACL 8 MG/250ML
INFUSION BOTTLE (ML) INTRAVENOUS
Status: COMPLETED | OUTPATIENT
Start: 2023-01-01 | End: 2023-01-01

## 2023-01-01 RX ORDER — SODIUM CHLORIDE 9 MG/ML
50 INJECTION, SOLUTION INTRAVENOUS CONTINUOUS
Status: ACTIVE | OUTPATIENT
Start: 2023-01-01 | End: 2023-01-01

## 2023-01-01 RX ORDER — MORPHINE SULFATE 10 MG/ML
1 INJECTION INTRAMUSCULAR; INTRAVENOUS; SUBCUTANEOUS EVERY 4 HOURS PRN
Status: DISCONTINUED | OUTPATIENT
Start: 2023-01-01 | End: 2023-01-01

## 2023-01-01 RX ORDER — ACETAMINOPHEN 325 MG/1
650 TABLET ORAL EVERY 4 HOURS PRN
Status: DISCONTINUED | OUTPATIENT
Start: 2023-01-01 | End: 2023-01-01 | Stop reason: HOSPADM

## 2023-01-01 RX ADMIN — POTASSIUM CHLORIDE 20 MEQ: 29.8 INJECTION, SOLUTION INTRAVENOUS at 09:59

## 2023-01-01 RX ADMIN — POTASSIUM CHLORIDE 20 MEQ: 29.8 INJECTION, SOLUTION INTRAVENOUS at 08:52

## 2023-01-01 RX ADMIN — MORPHINE SULFATE 1 MG: 2 INJECTION, SOLUTION INTRAMUSCULAR; INTRAVENOUS at 11:44

## 2023-01-01 RX ADMIN — TICAGRELOR 90 MG: 90 TABLET ORAL at 08:34

## 2023-01-01 RX ADMIN — INSULIN LISPRO 3 UNITS: 100 INJECTION, SOLUTION INTRAVENOUS; SUBCUTANEOUS at 12:16

## 2023-01-01 RX ADMIN — POTASSIUM CHLORIDE 20 MEQ: 10 CAPSULE, COATED, EXTENDED RELEASE ORAL at 08:50

## 2023-01-01 RX ADMIN — INSULIN DETEMIR 10 UNITS: 100 INJECTION, SOLUTION SUBCUTANEOUS at 08:34

## 2023-01-01 RX ADMIN — FUROSEMIDE 40 MG: 10 INJECTION, SOLUTION INTRAMUSCULAR; INTRAVENOUS at 00:04

## 2023-01-01 RX ADMIN — FUROSEMIDE 40 MG: 10 INJECTION, SOLUTION INTRAMUSCULAR; INTRAVENOUS at 08:14

## 2023-01-01 RX ADMIN — ACETAMINOPHEN 650 MG: 325 TABLET ORAL at 20:50

## 2023-01-01 RX ADMIN — Medication 0.8 MCG/KG/MIN: at 16:14

## 2023-01-01 RX ADMIN — TICAGRELOR 90 MG: 90 TABLET ORAL at 20:11

## 2023-01-01 RX ADMIN — Medication 10 ML: at 15:39

## 2023-01-01 RX ADMIN — MILRINONE LACTATE IN DEXTROSE 0.25 MCG/KG/MIN: 0.2 INJECTION, SOLUTION INTRAVENOUS at 00:44

## 2023-01-01 RX ADMIN — DOBUTAMINE HYDROCHLORIDE 5 MCG/KG/MIN: 200 INJECTION INTRAVENOUS at 23:09

## 2023-01-01 RX ADMIN — ASPIRIN 81 MG: 81 TABLET, COATED ORAL at 08:50

## 2023-01-01 RX ADMIN — MILRINONE LACTATE 0.25 MCG/KG/MIN: 0.2 INJECTION, SOLUTION INTRAVENOUS at 17:28

## 2023-01-01 RX ADMIN — MILRINONE LACTATE 0.25 MCG/KG/MIN: 0.2 INJECTION, SOLUTION INTRAVENOUS at 04:30

## 2023-01-01 RX ADMIN — ROSUVASTATIN 40 MG: 20 TABLET, FILM COATED ORAL at 20:50

## 2023-01-01 RX ADMIN — ROSUVASTATIN 40 MG: 20 TABLET, FILM COATED ORAL at 20:52

## 2023-01-01 RX ADMIN — HEPARIN SODIUM 5000 UNITS: 5000 INJECTION, SOLUTION INTRAVENOUS; SUBCUTANEOUS at 13:20

## 2023-01-01 RX ADMIN — HEPARIN SODIUM 5000 UNITS: 5000 INJECTION, SOLUTION INTRAVENOUS; SUBCUTANEOUS at 15:43

## 2023-01-01 RX ADMIN — FUROSEMIDE 40 MG: 10 INJECTION, SOLUTION INTRAMUSCULAR; INTRAVENOUS at 09:46

## 2023-01-01 RX ADMIN — METOLAZONE 5 MG: 5 TABLET ORAL at 10:16

## 2023-01-01 RX ADMIN — Medication 0.09 MCG/KG/MIN: at 00:52

## 2023-01-01 RX ADMIN — FUROSEMIDE 60 MG: 10 INJECTION, SOLUTION INTRAMUSCULAR; INTRAVENOUS at 15:39

## 2023-01-01 RX ADMIN — ASPIRIN 81 MG: 81 TABLET, COATED ORAL at 09:44

## 2023-01-01 RX ADMIN — SENNOSIDES AND DOCUSATE SODIUM 2 TABLET: 8.6; 5 TABLET ORAL at 20:50

## 2023-01-01 RX ADMIN — MILRINONE LACTATE 0.25 MCG/KG/MIN: 0.2 INJECTION, SOLUTION INTRAVENOUS at 05:36

## 2023-01-01 RX ADMIN — Medication 2 G: at 11:07

## 2023-01-01 RX ADMIN — Medication 0.4 MCG/KG/MIN: at 18:57

## 2023-01-01 RX ADMIN — INSULIN LISPRO 3 UNITS: 100 INJECTION, SOLUTION INTRAVENOUS; SUBCUTANEOUS at 08:53

## 2023-01-01 RX ADMIN — PROPOFOL 20 MCG/KG/MIN: 10 INJECTION, EMULSION INTRAVENOUS at 18:30

## 2023-01-01 RX ADMIN — SODIUM CHLORIDE 50 ML/HR: 9 INJECTION, SOLUTION INTRAVENOUS at 16:21

## 2023-01-01 RX ADMIN — PROPOFOL 5 MCG/KG/MIN: 10 INJECTION, EMULSION INTRAVENOUS at 02:48

## 2023-01-01 RX ADMIN — TICAGRELOR 90 MG: 90 TABLET ORAL at 07:34

## 2023-01-01 RX ADMIN — Medication 10 ML: at 20:51

## 2023-01-01 RX ADMIN — SENNOSIDES AND DOCUSATE SODIUM 2 TABLET: 8.6; 5 TABLET ORAL at 08:50

## 2023-01-01 RX ADMIN — DOBUTAMINE HYDROCHLORIDE 5 MCG/KG/MIN: 200 INJECTION INTRAVENOUS at 03:48

## 2023-01-01 RX ADMIN — FUROSEMIDE 20 MG: 10 INJECTION, SOLUTION INTRAMUSCULAR; INTRAVENOUS at 10:16

## 2023-01-01 RX ADMIN — ONDANSETRON 4 MG: 2 INJECTION INTRAMUSCULAR; INTRAVENOUS at 20:45

## 2023-01-01 RX ADMIN — TICAGRELOR 90 MG: 90 TABLET ORAL at 08:50

## 2023-01-01 RX ADMIN — ASPIRIN 81 MG 81 MG: 81 TABLET ORAL at 07:34

## 2023-01-01 RX ADMIN — MILRINONE LACTATE IN DEXTROSE 0.25 MCG/KG/MIN: 0.2 INJECTION, SOLUTION INTRAVENOUS at 14:35

## 2023-01-01 RX ADMIN — ROSUVASTATIN 40 MG: 20 TABLET, FILM COATED ORAL at 20:11

## 2023-01-01 RX ADMIN — MORPHINE SULFATE 1 MG: 2 INJECTION, SOLUTION INTRAMUSCULAR; INTRAVENOUS at 03:34

## 2023-01-01 RX ADMIN — HEPARIN SODIUM 5000 UNITS: 5000 INJECTION, SOLUTION INTRAVENOUS; SUBCUTANEOUS at 21:14

## 2023-01-01 RX ADMIN — DOBUTAMINE HYDROCHLORIDE 2.5 MCG/KG/MIN: 200 INJECTION INTRAVENOUS at 17:12

## 2023-01-01 RX ADMIN — TICAGRELOR 90 MG: 90 TABLET ORAL at 09:44

## 2023-01-01 RX ADMIN — MORPHINE SULFATE 1 MG: 2 INJECTION, SOLUTION INTRAMUSCULAR; INTRAVENOUS at 10:16

## 2023-01-01 RX ADMIN — DOBUTAMINE HYDROCHLORIDE 5 MCG/KG/MIN: 200 INJECTION INTRAVENOUS at 11:45

## 2023-01-01 RX ADMIN — Medication 10 ML: at 15:40

## 2023-01-01 RX ADMIN — ASPIRIN 81 MG: 81 TABLET, COATED ORAL at 08:34

## 2023-01-01 RX ADMIN — INSULIN DETEMIR 10 UNITS: 100 INJECTION, SOLUTION SUBCUTANEOUS at 20:52

## 2023-01-01 RX ADMIN — Medication 10 ML: at 08:53

## 2023-01-01 RX ADMIN — INSULIN LISPRO 2 UNITS: 100 INJECTION, SOLUTION INTRAVENOUS; SUBCUTANEOUS at 13:20

## 2023-01-01 RX ADMIN — INSULIN DETEMIR 13 UNITS: 100 INJECTION, SOLUTION SUBCUTANEOUS at 20:51

## 2023-01-01 RX ADMIN — MILRINONE LACTATE IN DEXTROSE 0.25 MCG/KG/MIN: 0.2 INJECTION, SOLUTION INTRAVENOUS at 15:12

## 2023-01-01 RX ADMIN — DOBUTAMINE HYDROCHLORIDE 5 MCG/KG/MIN: 200 INJECTION INTRAVENOUS at 14:03

## 2023-01-01 RX ADMIN — FUROSEMIDE 40 MG: 10 INJECTION, SOLUTION INTRAMUSCULAR; INTRAVENOUS at 20:11

## 2023-01-01 RX ADMIN — INSULIN LISPRO 3 UNITS: 100 INJECTION, SOLUTION INTRAVENOUS; SUBCUTANEOUS at 18:17

## 2023-01-01 RX ADMIN — POTASSIUM CHLORIDE 20 MEQ: 29.8 INJECTION, SOLUTION INTRAVENOUS at 11:23

## 2023-01-01 RX ADMIN — HEPARIN SODIUM 5000 UNITS: 5000 INJECTION, SOLUTION INTRAVENOUS; SUBCUTANEOUS at 05:38

## 2023-01-01 RX ADMIN — INSULIN DETEMIR 13 UNITS: 100 INJECTION, SOLUTION SUBCUTANEOUS at 08:53

## 2023-01-01 RX ADMIN — DOBUTAMINE HYDROCHLORIDE 5 MCG/KG/MIN: 200 INJECTION INTRAVENOUS at 15:39

## 2023-01-01 RX ADMIN — ROSUVASTATIN 40 MG: 20 TABLET, FILM COATED ORAL at 20:45

## 2023-01-01 RX ADMIN — FUROSEMIDE 60 MG: 10 INJECTION, SOLUTION INTRAMUSCULAR; INTRAVENOUS at 13:20

## 2023-01-01 RX ADMIN — TICAGRELOR 90 MG: 90 TABLET ORAL at 20:45

## 2023-01-01 RX ADMIN — MORPHINE SULFATE 1 MG: 2 INJECTION, SOLUTION INTRAMUSCULAR; INTRAVENOUS at 22:53

## 2023-01-01 RX ADMIN — FUROSEMIDE 40 MG: 10 INJECTION, SOLUTION INTRAMUSCULAR; INTRAVENOUS at 08:34

## 2023-01-01 RX ADMIN — FUROSEMIDE 60 MG: 10 INJECTION, SOLUTION INTRAMUSCULAR; INTRAVENOUS at 08:50

## 2023-01-01 RX ADMIN — Medication 10 ML: at 20:50

## 2023-01-01 RX ADMIN — HEPARIN SODIUM 5000 UNITS: 5000 INJECTION, SOLUTION INTRAVENOUS; SUBCUTANEOUS at 05:57

## 2023-01-01 RX ADMIN — FUROSEMIDE 60 MG: 10 INJECTION, SOLUTION INTRAMUSCULAR; INTRAVENOUS at 00:14

## 2023-01-01 RX ADMIN — INSULIN LISPRO 3 UNITS: 100 INJECTION, SOLUTION INTRAVENOUS; SUBCUTANEOUS at 08:34

## 2023-01-01 RX ADMIN — SODIUM CHLORIDE 250 MG: 9 INJECTION, SOLUTION INTRAVENOUS at 13:39

## 2023-01-01 RX ADMIN — HEPARIN SODIUM 5000 UNITS: 5000 INJECTION, SOLUTION INTRAVENOUS; SUBCUTANEOUS at 21:22

## 2023-01-01 RX ADMIN — FUROSEMIDE 40 MG: 10 INJECTION, SOLUTION INTRAMUSCULAR; INTRAVENOUS at 17:07

## 2023-01-01 RX ADMIN — TICAGRELOR 90 MG: 90 TABLET ORAL at 20:50

## 2023-01-01 RX ADMIN — TICAGRELOR 90 MG: 90 TABLET ORAL at 20:52

## 2023-01-06 NOTE — PROGRESS NOTES
Chief Complaint: Elevated PSA    Subjective         History of Present Illness  Vince Jain is a 72 y.o. male presents to [facility] to be seen for elevated PSA.    Patient had a screening PSA performed on 12/5/2022 and his PSA was found to be 9.3.     Unknown prior psa level given [place] was [place] before this.     He has had prostatitis previously about a year ago treated with 15 days of antibiotics.     At that time he had difficulty with urination and issues with erectile dysfunction.    He is on tamsulosin 0.4mg q day as prescribed by dr flynn for issues with urination.     Prior to taking flomax he had a slow stream and hesitancy.     He denies perineal pain or post ejaculate pain.     Father had prostate cancer dx in his late 60s early 70s.     Patient is on no anticoagulants.    Objective     Past Medical History:   Diagnosis Date   • Cataract    • Glaucoma    • Head injury     x2 1st 15yrs ago and last 2yrs ago   • Hernia, inguinal     right   • Hyperlipidemia    • Hypertension    • Prostatitis    • Stroke (HCC)     unsure was told he had a little one       Past Surgical History:   Procedure Laterality Date   • CATARACT EXTRACTION Right    • HERNIA REPAIR Right    • TONSILLECTOMY     • VEIN SURGERY Left          Current Outpatient Medications:   •  Furosemide (LASIX PO), Take  by mouth. Prn , unsure the name of water pill he takes, Disp: , Rfl:   •  rosuvastatin (Crestor) 20 MG tablet, Take 1 tablet by mouth Daily., Disp: 90 tablet, Rfl: 1  •  tamsulosin (FLOMAX) 0.4 MG capsule 24 hr capsule, Take 1 capsule by mouth Daily., Disp: 30 capsule, Rfl: 2    No Known Allergies     Family History   Problem Relation Age of Onset   • Stroke Mother    • Cancer Father        Social History     Socioeconomic History   • Marital status:    Tobacco Use   • Smoking status: Never   • Smokeless tobacco: Never   • Tobacco comments:     No second hand smoke exposure     Vaping Use   • Vaping Use: Never used   Substance and Sexual Activity   • Alcohol use: Not Currently     Comment: rarely   • Drug use: Never       Vital Signs:   Ht 173.7 cm (68.4\")   Wt 94.3 kg (208 lb)   BMI 31.26 kg/m²      Physical Exam     Result Review :   The following data was reviewed by: TERRI Oneil on 01/06/2023:  Results for orders placed or performed in visit on 01/06/23   POC Urinalysis Dipstick, Automated    Specimen: Urine   Result Value Ref Range    Color Yellow Yellow, Straw, Dark Yellow, Nataly    Clarity, UA Clear Clear    Specific Gravity  1.025 1.005 - 1.030    pH, Urine 5.0 5.0 - 8.0    Leukocytes Negative Negative    Nitrite, UA Negative Negative    Protein, POC Negative Negative mg/dL    Glucose, UA Negative Negative mg/dL    Ketones, UA Negative Negative    Urobilinogen, UA 0.2 E.U./dL Normal, 0.2 E.U./dL    Bilirubin Negative Negative    Blood, UA Negative Negative    Lot Number [id number]     Expiration Date 224       PSA    PSA 12/5/22   PSA 9.380 (A)   (A) Abnormal value                Procedures        Assessment and Plan    Diagnoses and all orders for this visit:    1. Elevated prostate specific antigen (PSA) (Primary)  -     POC Urinalysis Dipstick, Automated  -     MRI Prostate With & Without Contrast; Future      Discussed with the patient at this point in time given his strong family history of prostate cancer as well as his age and his current PSA level I would like to get him scheduled for an MRI of the prostate to delineate any underlying etiology and determine need for further intervention via fusion biopsy.  Patient is agreeable to this and we will follow-up with him in office after his MRI.        I spent 15 minutes caring for Vince on this date of service. This time includes time spent by me in the following activities:reviewing tests, obtaining and/or reviewing a separately obtained history, performing a medically appropriate examination and/or evaluation ,  counseling and educating the patient/family/caregiver, ordering medications, tests, or procedures, and documenting information in the medical record  Follow Up   Return for Follow-up after MRI.  Patient was given instructions and counseling regarding his condition or for health maintenance advice. Please see specific information pulled into the AVS if appropriate.         This document has been electronically signed by TERRI Oneil  January 6, 2023 10:52 EST

## 2023-02-07 PROBLEM — R97.20 ELEVATED PSA: Chronic | Status: ACTIVE | Noted: 2022-01-01

## 2023-02-07 NOTE — ASSESSMENT & PLAN NOTE
The patient was educated about the results of his MRI.  He was encouraged to follow-up with urology on the 10th.

## 2023-02-07 NOTE — ASSESSMENT & PLAN NOTE
Patient's (Body mass index is 31.83 kg/m².) indicates that they are obese (BMI >30) with health conditions that include hypertension and dyslipidemias . Weight is unchanged. BMI  is above average; BMI management plan is completed. We discussed low calorie, low carb based diet program, portion control and increasing exercise.

## 2023-02-07 NOTE — PROGRESS NOTES
"Chief Complaint  Results (Mri ), Leg Swelling, dry skin dry and itchy , whitsle sound when sleeping , and Fatigue    Subjective        Vince Jain presents to [facility]  History of Present Illness   He is here today for management of his chronic medical conditions. He is  and has three sons and three daughters. They have 7 grandchildren.      Has hx of closed head injury x 2. Having memory trouble that is worsening. Family concerned.    He had a PSA of over 9 recently and a prostate MRI revealed a suspicous lesion.      The patient has no other complaints today and denies chest pain, shortness of breath, weakness, numbness, nausea, vomiting, diarrhea, dizziness or syncopal event.      Objective   Vital Signs:  /97   Pulse 62   Temp 96.2 °F (35.7 °C)   Resp 18   Ht 173.7 cm (68.39\")   Wt 96 kg (211 lb 11.2 oz)   SpO2 98%   BMI 31.83 kg/m²   Estimated body mass index is 31.83 kg/m² as calculated from the following:    Height as of this encounter: 173.7 cm (68.39\").    Weight as of this encounter: 96 kg (211 lb 11.2 oz).             Physical Exam  Vitals reviewed.   Constitutional:       Appearance: Normal appearance. He is well-developed. He is obese.   HENT:      Head: Normocephalic and atraumatic.      Right Ear: External ear normal.      Left Ear: External ear normal.      Mouth/Throat:      Pharynx: No oropharyngeal exudate.   Eyes:      Conjunctiva/sclera: Conjunctivae normal.      Pupils: Pupils are equal, round, and reactive to light.   Neck:      Vascular: No carotid bruit.   Cardiovascular:      Rate and Rhythm: Normal rate and regular rhythm.      Heart sounds: No murmur heard.    No friction rub. No gallop.   Pulmonary:      Effort: Pulmonary effort is normal.      Breath sounds: Normal breath sounds. No wheezing or rhonchi.   Abdominal:      General: There is no distension.   Skin:     General: Skin is warm and dry.   Neurological:      Mental " Status: He is alert and oriented to person, place, and time.      Cranial Nerves: No cranial nerve deficit.      Motor: No weakness.   Psychiatric:         Mood and Affect: Mood and affect normal.         Behavior: Behavior normal.         Thought Content: Thought content normal.         Judgment: Judgment normal.        Result Review :    CMP    CMP 12/5/22   Glucose 111 (A)   BUN 15   Creatinine 1.05   eGFR 76.4   Sodium 139   Potassium 4.6   Chloride 102   Calcium 9.8   Total Protein 7.1   Albumin 4.00   Globulin 3.1   Total Bilirubin 0.6   Alkaline Phosphatase 83   AST (SGOT) 26   ALT (SGPT) 10   Albumin/Globulin Ratio 1.3   BUN/Creatinine Ratio 14.3   Anion Gap 11.5   (A) Abnormal value       Comments are available for some flowsheets but are not being displayed.           CBC    CBC 12/5/22   WBC 4.44   RBC 5.10   Hemoglobin 14.4   Hematocrit 44.2   MCV 86.7   MCH 28.2   MCHC 32.6   RDW 13.4   Platelets 220           Lipid Panel    Lipid Panel 12/5/22   Total Cholesterol 249 (A)   Triglycerides 125   HDL Cholesterol 54   VLDL Cholesterol 22   LDL Cholesterol  173 (A)   LDL/HDL Ratio 3.15   (A) Abnormal value            TSH    TSH 12/5/22   TSH 2.460                        Assessment and Plan   Diagnoses and all orders for this visit:    1. Lower extremity edema (Primary)  Comments:  He appears to have a degree of fluid overload.  This may be why his blood pressure is elevated.  He was given order for Lasix today to be taken as directed.  Orders:  -     Comprehensive metabolic panel; Future    2. Class 1 obesity due to excess calories without serious comorbidity with body mass index (BMI) of 31.0 to 31.9 in adult  Assessment & Plan:  Patient's (Body mass index is 31.83 kg/m².) indicates that they are obese (BMI >30) with health conditions that include hypertension and dyslipidemias . Weight is unchanged. BMI  is above average; BMI management plan is completed. We discussed low calorie, low carb based diet  program, portion control and increasing exercise.       3. Mixed hyperlipidemia  Assessment & Plan:  Lipid abnormalities are improving with treatment.  Nutritional counseling was provided. and Pharmacotherapy as ordered.  Lipids will be reassessed in 6 months.    Orders:  -     Lipid panel; Future    4. Elevated PSA  Assessment & Plan:  The patient was educated about the results of his MRI.  He was encouraged to follow-up with urology on the 10th.      Other orders  -     furosemide (Lasix) 20 MG tablet; Take 2 tablets by mouth Daily As Needed (fluid retention). Prn , unsure the name of water pill he takes  Dispense: 60 tablet; Refill: 2  -     rosuvastatin (Crestor) 20 MG tablet; Take 1 tablet by mouth Daily.  Dispense: 90 tablet; Refill: 1           Follow Up   Return in about 1 month (around 3/7/2023).  Patient was given instructions and counseling regarding his condition or for health maintenance advice. Please see specific information pulled into the AVS if appropriate.

## 2023-02-09 NOTE — PROGRESS NOTES
This is the MRI fusion kristina to schedule. Spoke with him he is aware of results and mri biopsy date.

## 2023-03-06 PROBLEM — I21.02 STEMI INVOLVING LEFT ANTERIOR DESCENDING CORONARY ARTERY: Status: ACTIVE | Noted: 2023-01-01

## 2023-03-06 PROBLEM — I21.3 STEMI (ST ELEVATION MYOCARDIAL INFARCTION): Status: ACTIVE | Noted: 2023-01-01

## 2023-03-06 PROBLEM — R57.0 CARDIOGENIC SHOCK: Status: ACTIVE | Noted: 2023-01-01

## 2023-03-06 PROBLEM — Z95.5 S/P CORONARY ARTERY STENT PLACEMENT: Status: ACTIVE | Noted: 2023-01-01

## 2023-03-06 NOTE — SIGNIFICANT NOTE
03/06/23 0910   Coping/Psychosocial   Observed Emotional State anxious   Family/Support Persons spouse;daughter;family;friend   Involvement in Care no interaction with patient   Additional Documentation Spiritual Care (Group)   Spiritual Care   Use of Spiritual Resources spirituality for coping, indicated strong use of   Spiritual Care Source other (see comments)  (Emergency Deparment)   Spiritual Care Follow-Up follow-up, none required as presently assessed   Response to Spiritual Care engaged in conversation;receptive of support;thanks expressed   Spiritual Care Interventions supportive conversation provided   Spiritual Care Visit Type other (see comments)  (Code Blue, Emergency Deparment.)   Spiritual Care Request family support;hospitality support;mood/anxiety support   Receptivity to Spiritual Care visit welcomed     Family support. Pt was sent to [facility]. He is fine and he will send to [facility]. The family is [employer].

## 2023-03-06 NOTE — PROCEDURES
Procedure: Endotracheal Intubation     Indication: Respiratory Failure, Airway protection, Decompensated HF    Consent obtained: Emergent    Medications:  20mg etomidate  50mg rocuronium    Patient was sedated with sedatives. GlideScope was used to visualize epiglottis and vocal cord. Grade II view. Endotracheal intubation was done on first attempt with size 8 ET tube under direct glideScope view. Patient tolerated the procedure well.     Pending Chest XR pending placement    Complications: None.    Electronically signed by Mindy Triana MD, 3/6/2023, 10:49 EST.

## 2023-03-06 NOTE — CONSULTS
Pulmonary / Critical Care Consult Note      Patient Name: Vince Jain  : 1950  MRN: [id number]  Primary Care Physician:  Yady Schneider DO  Referring Physician: Sanchez Zaman MD  Date of admission: 3/6/2023    Subjective   Subjective     Reason for Consult/ Chief Complaint:   Cardiogenic shock, respiratory failure requiring mechanical ventilation    HPI:  Vince Jain is a 72 y.o. male with unclear past medical history presented to the ED with chest pain found to have anterior LAD STEMI status post PCI.  During catheterization patient was found to be HD unstable with bleeding at access site.  ICU team was called down to the Cath Lab and emergent intubation was placed for airway protection.  At the time, interventional cardiology team was placing an Impella for patient.  However the Impella device was ultimately not placed due to malpositioning versus hematoma around access site.  The procedure was aborted and patient was transferred to the ICU for further management. Of note, patient is also a Jehovah witness with no blood transfusions.  On arrival to the ICU patient is intubated on vent settings 24/450/70%FiO2/PEEP of 8. He is on propofol gtt. Norepinephrine gtt was also running at 0.6 mcg/kg/min. Bedside US showed weakened heart with EF<30% even with high dose levophed. ABG 7.26/44/297. No other acute issues at this time.       Review of Systems  Unable to obtain      Personal History     Past Medical History:   Diagnosis Date   • Cataract    • Glaucoma    • Head injury     x2 1st 15yrs ago and last 2yrs ago   • Hernia, inguinal     right   • Hyperlipidemia    • Hypertension    • Prostatitis    • Stroke (HCC)     unsure was told he had a little one       Past Surgical History:   Procedure Laterality Date   • CATARACT EXTRACTION Right    • HERNIA REPAIR Right    • TONSILLECTOMY     • VEIN SURGERY Left        Family History: family history includes Cancer in his father; Stroke in his mother.  Otherwise pertinent FHx was reviewed and not pertinent to current issue.    Social History:  reports that he has never smoked. He has never been exposed to tobacco smoke. He has never used smokeless tobacco. He reports that he does not currently use alcohol. He reports that he does not use drugs.    Home Medications:  furosemide, rosuvastatin, and tamsulosin    Allergies:  No Known Allergies    Objective    Objective     Vitals:   Temp:  [97.6 °F (36.4 °C)] 97.6 °F (36.4 °C)  Heart Rate:  [] 80  Resp:  [24] 24  BP: ()/(35-97) 98/77  Flow (L/min):  [6] 6  FiO2 (%):  [70 %-95 %] 70 %    Physical Exam:  Vital Signs Reviewed   General:   NAD. Lying in bed    HEENT:  PERRL, EOMI.  OP, nares clear  Neck:  Supple, no JVD, no thyromegaly  Chest:  good aeration, no work of breathing noted on mechanical ventilation  CV: RRR, no MGR, pulses 2+, equal.  Abd:  Soft, NT, ND, + BS, no HSM  EXT:  no clubbing, no cyanosis, no edema  Neuro:  CN grossly intact, no focal deficits. Sedated  Skin: No rashes or lesions noted      Result Review    Result Review:  I have personally reviewed the results from the time of this admission to 3/6/2023 16:44 EST and agree with these findings:  [x]  Laboratory  [x]  Microbiology  [x]  Radiology  []  EKG/Telemetry   []  Cardiology/Vascular   []  Pathology  []  Old records  []  Other:  Most notable findings include:   -ABG 7.26/44/297    Assessment & Plan   Assessment / Plan     Active Hospital Problems:  Active Hospital Problems    Diagnosis    • **STEMI involving left anterior descending coronary artery (HCC)    • Cardiogenic shock (HCC)    • S/P coronary artery stent placement          Impression:  Acute cardiogenic shock  STEMI status post PCI to LAD  Respiratory failure requiring mechanical ventilation  Metabolic acidosis   Lactic acidosis  Hyperglycemia  Jehovah Witness patient    Plan:  -Keep patient intubated overnight; repeat ABG at 1900; adjust vent accordingly based on  ABG  -Continue low-dose propofol to keep RASS -2; PRN Fent; Daily SAT/SBT; plan for extubation in the morning  -Levo currently at 0.6 mcg/kg/min; bedside ultrasound showed poor squeeze despite high levels of norepinephrine.  We will add dobutamine at 2.5 mcg/kg/min with goal of weaning down norepinephrine.  If further inotropic support is needed, will add epinephrine   -Patient has Silva, monitor urine output and renal function  -Continue aspirin, statin, Brilinta for PCI to LAD  -Avoid beta-blockade given shock   -Follow lactic acid  -Monitor femoral access site for hematoma  -Monitor hemoglobin  -No DVT ppx at this time  -NPO at this time    DVT prophylaxis:  No DVT prophylaxis order currently exists.     Code Status and Medical Interventions:   Ordered at: 03/06/23 4514     Level Of Support Discussed With:    Patient     Code Status (Patient has no pulse and is not breathing):    CPR (Attempt to Resuscitate)     Medical Interventions (Patient has pulse or is breathing):    Full Support     Release to patient:    Routine Release      The patient is critically ill in the ICU with respiratory failure requiring mechanical ventilation, acute cardiogenic shock requiring inotropic support. Multidisciplinary bedside critical care rounds were performed with nursing staff, respiratory therapy, pharmacy, nutritional services, social work. I have personally reviewed the chart, labs and any pertinent imaging available.  I have spent 37 minutes of critical care time, excluding procedures, in the care of this patient.    Electronically signed by Mindy Triana MD, 03/06/23, 4:44 PM EST.

## 2023-03-06 NOTE — ED PROVIDER NOTES
Time: 11:49 AM EST  Date of encounter:  3/6/2023  Independent Historian/Clinical History and Information was obtained by:   EMS  Chief Complaint: Chest pain    History is limited by: Acuity of Condition    History of Present Illness:  Patient is a 72 y.o. year old male who presents to the emergency department for evaluation of chest pain.  Patient presents to [facility] via EMS for evaluation treatment of chest pain.  Chest pain began at around 730 or so this morning.  Patient reported midsternal chest pain.  Patient called 911.  EMS states that when they were putting the patient on the cot he had a syncopal episode.  EMS performed by EMS was concerning for ST elevation MI.  EKG was transmitted to the ED and shown to me.  Patient was transported emergently to the ER for further evaluation.    HPI    Patient Care Team  Primary Care Provider: Yady Schneider DO    Past Medical History:     No Known Allergies  Past Medical History:   Diagnosis Date   • Cataract    • Glaucoma    • Head injury     x2 1st 15yrs ago and last 2yrs ago   • Hernia, inguinal     right   • Hyperlipidemia    • Hypertension    • Prostatitis    • Stroke (HCC)     unsure was told he had a little one     Past Surgical History:   Procedure Laterality Date   • CATARACT EXTRACTION Right    • HERNIA REPAIR Right    • TONSILLECTOMY     • VEIN SURGERY Left      Family History   Problem Relation Age of Onset   • Stroke Mother    • Cancer Father        Home Medications:  Prior to Admission medications    Medication Sig Start Date End Date Taking? Authorizing Provider   ciprofloxacin (Cipro) 500 MG tablet 1 tablet by mouth 2 times a day starting day before biopsy 2/9/23   Eve Espinoza W, APRN   furosemide (Lasix) 20 MG tablet Take 2 tablets by mouth Daily As Needed (fluid retention). Prn , unsure the name of water pill he takes 2/7/23   Yady Schneider DO   rosuvastatin (Crestor) 20 MG tablet Take 1 tablet by mouth Daily. 2/7/23   Yady Schneider  "DO   tamsulosin (FLOMAX) 0.4 MG capsule 24 hr capsule TAKE 1 CAPSULE BY MOUTH DAILY 1/9/23   Yady Schneider DO        Social History:   Social History     Tobacco Use   • Smoking status: Never     Passive exposure: Never   • Smokeless tobacco: Never   • Tobacco comments:     No second hand smoke exposure    Vaping Use   • Vaping Use: Never used   Substance Use Topics   • Alcohol use: Not Currently     Comment: rarely   • Drug use: Never         Review of Systems:  Review of Systems   Constitutional: Negative for chills and fever.   HENT: Negative for congestion, ear pain and sore throat.    Eyes: Negative for pain.   Respiratory: Negative for cough, chest tightness and shortness of breath.    Cardiovascular: Positive for chest pain.   Gastrointestinal: Negative for abdominal pain, diarrhea, nausea and vomiting.   Genitourinary: Negative for flank pain and hematuria.   Musculoskeletal: Negative for joint swelling.   Skin: Negative for pallor.   Neurological: Negative for seizures and headaches.   All other systems reviewed and are negative.       Physical Exam:  /88   Pulse 99   Temp 97.6 °F (36.4 °C) (Axillary)   Resp 24   Ht 165.1 cm (65\")   Wt 95.6 kg (210 lb 12.2 oz)   SpO2 (!) 88%   BMI 35.07 kg/m²     Physical Exam       Vital signs were reviewed on EMS cot.  Patient was noted be hypotensive.  ED nursing staff did not obtain vital signs.  General appearance - Patient appears well-developed and well-nourished.  Patient is an acute distress.  Patient was ashen in color.  Patient is mildly diaphoretic.  Head - Normocephalic, atraumatic.  Pupils - Equal, round, reactive to light.  Extraocular muscles are intact.  Conjunctive is clear.  Nasal - Normal inspection.  No evidence of trauma or epistaxis.  Tympanic membranes - Gray, intact without erythema or retractions.  Oral mucosa - Pink and moist without lesions or erythema.  Uvula is midline.  Chest wall - Atraumatic.  Chest wall is nontender.  There is " no vesicular rashes noted.  Neck - Supple.  Trachea was midline.  There is no palpable lymphadenopathy or thyromegaly.  There are no meningeal signs  Lungs - Clear to auscultation and percussion bilaterally.  Heart - Regular rate and rhythm without any murmurs, clicks, or gallops.  Abdomen - Soft.  Bowel sounds are present.  There is no palpable tenderness.  There is no rebound, guarding, or rigidity.  There are no palpable masses.  There are no pulsatile masses.  Back - Spine is straight and midline.  There is no CVA tenderness.  Extremities - Intact x4 with full range of motion.  There is no palpable edema.  Pulses are intact x4 and equal.  Neurologic - Patient is awake, alert, and oriented x3.  Cranial nerves II through XII are grossly intact.  Motor and sensory functions grossly intact.  Cerebellar function was normal.  Integument - There are no rashes.  There are no petechia or purpura lesions noted.  There are no vesicular lesions noted.        Procedures:  Procedures      Medical Decision Making:      Comorbidities that affect care:    CVA, Coronary Artery Disease, Hypertension    External Notes reviewed:    EMS Run sheet: EMS vital signs were reviewed.  Patient is noted to be in cardiogenic shock.  Patient was given liter of IV fluids in route.      The following orders were placed and all results were independently analyzed by me:  Orders Placed This Encounter   Procedures   • Basic Metabolic Panel   • Blood Gas, Arterial -With Co-Ox Panel: Yes   • CBC Auto Differential   • CBC (No Diff)   • Basic Metabolic Panel   • High Sensitivity Troponin T   • Hemoglobin A1c   • Lipid Panel   • Hemoglobin & Hematocrit, Blood   • ABG with Co-Ox and Electrolytes   • NPO Diet NPO Type: Strict NPO   • Vital Signs and Check distal extremity for warmth, color, sensation and pulses with each vital sign and site check.   • Cardiac Monitoring   • Change site dressing   • Obtain STAT EKG during chest pain. Notify MD of any  change in rhythm, ST segments or complaints of chest pain.   • Encourage fluids   • Strict intake and output   • Activity - Strict Bed Rest   • Assess Puncture Site, Vital SIgns, Distal Pulses & Observe Site for Bleeding or Swelling   • Remove Femoral / Brachial Sheaths   • Apply Femostop   • Head of Bed: Flat; Activity Level: Strict Bed Rest; Keep Affected Extremity/ Extremities Straight; Total Bedrest Time? Other; Length of Time: Continuous bedrest - patient has Impella and is intubated   • Notify MD if platelet count is less than 100,000, is less than 1/2 baseline, or if Hgb drops by more than 3mg/dl.   • Notify MD of hypotension (SBP less than 95), bleeding, or dysrythmia and follow Sheath Removal Policy if needed.   • Hold metFORMIN (GLUCOPHAGE) for 48 Hours   • Continue Indwelling Urinary Catheter   • Assess Need for Indwelling Urinary Catheter - Follow Removal Protocol   • Urinary Catheter Care   • Code Status and Medical Interventions:   • Cardiac Rehab Evaluation and Enrollment   • Oxygen Therapy- Nasal Cannula; 2 LPM; Titrate for SPO2: 90%   • POC Activated Clotting Time   • POC Activated Clotting Time   • POC Activated Clotting Time   • POC Activated Clotting Time   • POC Activated Clotting Time   • POC Activated Clotting Time   • POC Activated Clotting Time   • POC Activated Clotting Time   • POC Activated Clotting Time   • POC Activated Clotting Time   • POC Activated Clotting Time   • POC Activated Clotting Time   • POC Activated Clotting Time   • POC Activated Clotting Time   • EP/CRM Study   • ECG 12 Lead   • ECG 12 Lead   • Inpatient Admission   • CBC & Differential       Medications Given in the Emergency Department:  Medications   rocuronium (ZEMURON) injection 100 mg (has no administration in time range)   sodium chloride 0.9 % infusion (has no administration in time range)   acetaminophen (TYLENOL) tablet 650 mg (has no administration in time range)   Morphine injection 1 mg (has no  administration in time range)     And   naloxone (NARCAN) injection 0.4 mg (has no administration in time range)   ondansetron (ZOFRAN) tablet 4 mg (has no administration in time range)     Or   ondansetron (ZOFRAN) injection 4 mg (has no administration in time range)   aspirin EC tablet 81 mg (has no administration in time range)   ticagrelor (BRILINTA) tablet 90 mg (has no administration in time range)   metoprolol tartrate (LOPRESSOR) tablet 25 mg (has no administration in time range)   rosuvastatin (CRESTOR) tablet 40 mg (has no administration in time range)   iopamidol (ISOVUE-370) 76 % injection (453 mL Intravenous Given 3/6/23 1400)   norepinephrine (LEVOPHED) 8 mg in 250 mL NS infusion (premix) (has no administration in time range)   norepinephrine (LEVOPHED) 8 mg in 250 mL NS infusion (premix) (0.6 mcg/kg/min × 96 kg Intravenous New Bag 3/6/23 1423)   cangrelor 50 mg in 250 ml (200 mcg/ml) IV infusion (4 mcg/kg/min × 96 kg Intravenous Rate/Dose Change 3/6/23 1200)   ceFAZolin in 0.9% normal saline (ANCEF) IVPB solution 2 g (0 g Intravenous Stopped 3/6/23 1140)   propofol (DIPRIVAN) infusion 10 mg/mL 100 mL (20 mcg/kg/min × 96 kg Intravenous Rate/Dose Change 3/6/23 1145)        ED Course:     [facility] picked up the patient from his local residence.  Twelve-lead EKG transmitted to [facility] ER was interpreted by me.  Patient was noted to have ST elevation MI in progress.  Cath Lab/STEMI alert was made by me.  I did discuss case with Dr. Zaman.  EMS also called me for direct medical control.  Patient was ordered a liter IV fluids for his cardiogenic shock.  Patient was transported emergently to the ER.  Upon arrival to the ER the Cath Lab was ready for the patient the patient was taken directly to the Cath Lab.  I follow the patient from the EMS entrance to the Cath Lab.  Report was given to Dr. Zaman.  Patient did report that he is Islam and does not want any blood  products.    Labs:    Lab Results (last 24 hours)     Procedure Component Value Units Date/Time    CBC & Differential [[id number]]  (Abnormal) Collected: 03/06/23 0927    Specimen: Blood Updated: 03/06/23 0958    Narrative:      The following orders were created for panel order CBC & Differential.  Procedure                               Abnormality         Status                     ---------                               -----------         ------                     CBC Auto Differential[[id number]]        Abnormal            Final result                 Please view results for these tests on the individual orders.    Basic Metabolic Panel [[id number]]  (Abnormal) Collected: 03/06/23 0927    Specimen: Blood Updated: 03/06/23 1022     Glucose 254 mg/dL      BUN 18 mg/dL      Creatinine 1.11 mg/dL      Sodium 137 mmol/L      Potassium 3.7 mmol/L      Chloride 105 mmol/L      CO2 20.3 mmol/L      Calcium 7.9 mg/dL      BUN/Creatinine Ratio 16.2     Anion Gap 11.7 mmol/L      eGFR 71.4 mL/min/1.73     Narrative:      GFR Normal >60  Chronic Kidney Disease <60  Kidney Failure <15      CBC Auto Differential [[id number]]  (Abnormal) Collected: 03/06/23 0927    Specimen: Blood Updated: 03/06/23 0958     WBC 4.21 10*3/mm3      RBC 4.31 10*6/mm3      Hemoglobin 12.0 g/dL      Hematocrit 36.7 %      MCV 85.2 fL      MCH 27.8 pg      MCHC 32.7 g/dL      RDW 13.7 %      RDW-SD 42.7 fl      MPV 9.8 fL      Platelets 191 10*3/mm3      Neutrophil % 52.6 %      Lymphocyte % 34.7 %      Monocyte % 11.2 %      Eosinophil % 0.5 %      Basophil % 0.5 %      Immature Grans % 0.5 %      Neutrophils, Absolute 2.22 10*3/mm3      Lymphocytes, Absolute 1.46 10*3/mm3      Monocytes, Absolute 0.47 10*3/mm3      Eosinophils, Absolute 0.02 10*3/mm3      Basophils, Absolute 0.02 10*3/mm3      Immature Grans, Absolute 0.02 10*3/mm3      nRBC 0.0 /100 WBC     POC Activated Clotting Time [[id number]]  (Abnormal) Collected: 03/06/23 0947    Specimen:  Blood Updated: 03/06/23 0954     Activated Clotting Time  197 Seconds      Comment: Serial Number: [id number]:  [id number]       POC Activated Clotting Time [[id number]]  (Abnormal) Collected: 03/06/23 1001    Specimen: Blood Updated: 03/06/23 1009     Activated Clotting Time  245 Seconds      Comment: Serial Number: [id number]:  [id number]       POC Activated Clotting Time [[id number]]  (Abnormal) Collected: 03/06/23 1024    Specimen: Blood Updated: 03/06/23 1030     Activated Clotting Time  311 Seconds      Comment: Serial Number: [id number]:  [id number]       POC Activated Clotting Time [[id number]]  (Abnormal) Collected: 03/06/23 1055    Specimen: Blood Updated: 03/06/23 1102     Activated Clotting Time  335 Seconds      Comment: Serial Number: [id number]:  [id number]       POC Activated Clotting Time [[id number]]  (Abnormal) Collected: 03/06/23 1135    Specimen: Blood Updated: 03/06/23 1141     Activated Clotting Time  275 Seconds      Comment: Serial Number: [id number]:  [id number]       POC Activated Clotting Time [[id number]]  (Abnormal) Collected: 03/06/23 1206    Specimen: Blood Updated: 03/06/23 1212     Activated Clotting Time  275 Seconds      Comment: Serial Number: [id number]:  [id number]       POC Activated Clotting Time [[id number]]  (Abnormal) Collected: 03/06/23 1326    Specimen: Blood Updated: 03/06/23 1332     Activated Clotting Time  227 Seconds      Comment: Serial Number: [id number]:  [id number]       Hemoglobin & Hematocrit, Blood [[id number]]  (Abnormal) Collected: 03/06/23 1412    Specimen: Blood Updated: 03/06/23 1433     Hemoglobin 12.5 g/dL      Hematocrit 39.7 %     ABG with Co-Ox and Electrolytes [[id number]]  (Abnormal) Collected: 03/06/23 1500    Specimen: Arterial Blood Updated: 03/06/23 1504     pH, Arterial 7.261 pH units      pCO2, Arterial 44.0 mm Hg      pO2, Arterial 297.6 mm Hg      HCO3, Arterial 19.3 mmol/L      Base Excess, Arterial -7.5 mmol/L      O2 Saturation, Arterial 99.0 %       Hemoglobin, Blood Gas 13.1 g/dL      Carboxyhemoglobin 0.1 %      Methemoglobin 0.30 %      Oxyhemoglobin 98.6 %      FHHB 1.0 %      Tyler's Test Positive     Note  RR24 P+5     Site Arterial: left radial     Modality AC/VC     FIO2 95 %      Sodium, Arterial 136.6 mmol/L      Potassium, Arterial 5.22 mmol/L      Ionized Calcium, Arterial 1.03 mmol/L      Chloride, Arterial 107 mmol/L      Glucose, Arterial 309 mg/dL      Lactate, Arterial 2.79 mmol/L      PO2/FIO2 313           Imaging:    No Radiology Exams Resulted Within Past 24 Hours      Differential Diagnosis and Discussion:    Chest Pain:  Based on the patient's signs and symptoms, I considered aortic dissection, myocardial infaction, pulmonary embolism, cardiac tamponade, pericarditis, pneumothorax, musculoskeletal chest pain and other differential diagnosis as an etiology of the patient's chest pain.     EKG was interpreted by me.  EMS EKG was interpreted by me at 851 to show a sinus bradycardia with a ventricular rate of 48 bpm.  P waves are normal.  QRS interval was widened 150 ms with what appears to be a right bundle branch block.  Axis is -81 degrees.  Patient noted to have significant ST segment elevation in leads V2, V3, V4, V5 as well as leads I and aVL.  This is concerning for an LAD ST elevation MI.  No old EKGs were available for comparison at this time.    [facility]     Critical Care Note: Total Critical Care time of 30 minutes. Total critical care time documented does not include time spent on separately billed procedures for services of nurses or physician assistants. I personally saw and examined the patient. I have reviewed all diagnostic interpretations and treatment plans as written. I was present for the key portions of any procedures performed and the inclusive time noted in any critical care statement. Critical care time includes patient management by me, time spent at the patients bedside,  time to review lab and imaging  results, discussing patient care, documentation in the medical record, and time spent with family or caregiver.    Patient Care Considerations:          Consultants/Shared Management Plan:    Consultant: I have discussed the case with Dr. Sanchez Zaman who states Agreed with Cath Lab activation and will take the patient emergently to the Cath Lab for PCTA intervention.    Social Determinants of Health:    Patient is independent, reliable, and has access to care.       Disposition and Care Coordination:    Admit:   Through independent evaluation of the patient's history, physical, and imperical data, the patient meets criteria for observation/admission to the hospital.        Final diagnoses:   ST elevation myocardial infarction (STEMI), unspecified artery (HCC)   Cardiogenic shock (HCC)        ED Disposition     ED Disposition   Decision to Admit    Condition   --    Comment   --             This medical record created using voice recognition software.           Eliot Carmona DO  03/06/23 1602

## 2023-03-06 NOTE — PLAN OF CARE
Goal Outcome Evaluation:  Plan of Care Reviewed With: patient        Progress: no change  Outcome Evaluation: Pt on vent, settings RR 24, , 70 %, 10 CWP PEEP.

## 2023-03-06 NOTE — H&P
[facility]   HISTORY AND PHYSICAL    Patient Name: Vince Jain  : 1950  MRN: [id number]  Primary Care Physician:  Yady Schneider DO  Date of admission: 3/6/2023    Subjective   Subjective     Chief Complaint: chest pain    HPI:  Vince Jain is a 72 y.o. male with hypertension, hyperlipidemia, and prior CVA who presented as a STEMI alert this morning.  Per the patient's family, he developed severe midsternal chest pain at home about 8:00 AM shortly after working on a ladder outside.  Upon EMS arrival, he was noted to be diaphoretic and in shock with a BP of 56/38.  An ECG obtained by EMS in the field demonstrated sinus bradycardia with marked ST elevation throughout the precordial leads and reciprocal ST depression, consistent with an acute anterior/anterolateral STEMI.  Mr. Jain was taken directly to the Cath Lab upon arrival and an emergent left heart cath was performed.  It demonstrated a culprit occluded mid LAD with heavy thrombus burden, as well as some thrombus in the distal segment of the OM branch of the circumflex.  In addition, Mr. Jain was noted to have severe LV dysfunction with an estimated ejection fraction of 20% and anteroapical akinesis.  Due to worsening cardiogenic shock, an Impella CP percutaneous LVAD was placed prior to consideration of PCI.  The patient's hemodynamics rapidly improved after placement of the Impella device, and successful PCI was performed to the culprit LAD with restoration of CINDY-3 flow throughout the LAD system and no evidence of complications.  During the case, Mr Jain had to be emergently intubated by Dr. Triana due to agitation and worsening respiratory distress.  A couple hours after PCI was performed, the patient was observed to have significant oozing around the Impella CP sheath site with evidence of a left femoral hematoma.  Because Mr. Jain is a [employer] and declines use of any blood products, I decided it was  best to remove the Impella device at that point to prevent possible worsening bleeding complications.  The Impella was removed and firm manual pressure was held above the insertion site for approximately an hour, with good hemostasis achieved.  Mr. Jain was subsequently transferred to the CCU and remains on vasopressor support with Levophed at 0.4 mcg/kg/min at present.  He is currently intubated, but his vent settings are being weaned.    Review of Systems   Constitutional: Positive for diaphoresis and fatigue. Negative for chills and fever.   HENT: Negative for hearing loss, sore throat and trouble swallowing.    Eyes: Negative for pain and visual disturbance.   Respiratory: Positive for shortness of breath. Negative for chest tightness and wheezing.    Cardiovascular: Positive for chest pain. Negative for palpitations and leg swelling.   Gastrointestinal: Positive for nausea. Negative for abdominal distention, abdominal pain and blood in stool.   Endocrine: Negative for cold intolerance and heat intolerance.   Genitourinary: Negative for dysuria and hematuria.   Musculoskeletal: Negative for joint swelling and myalgias.   Skin: Negative for color change, pallor and rash.   Neurological: Positive for light-headedness. Negative for syncope, speech difficulty and headaches.   Hematological: Negative for adenopathy. Does not bruise/bleed easily.        Personal History     Past Medical History:   Diagnosis Date   • Cataract    • Glaucoma    • Head injury     x2 1st 15yrs ago and last 2yrs ago   • Hernia, inguinal     right   • Hyperlipidemia    • Hypertension    • Prostatitis    • Stroke (HCC)     unsure was told he had a little one       Past Surgical History:   Procedure Laterality Date   • CATARACT EXTRACTION Right    • HERNIA REPAIR Right    • TONSILLECTOMY     • VEIN SURGERY Left        Family History: family history includes Cancer in his father; Stroke in his mother. Otherwise pertinent FHx was reviewed  and not pertinent to current issue.    Social History:  reports that he has never smoked. He has never been exposed to tobacco smoke. He has never used smokeless tobacco. He reports that he does not currently use alcohol. He reports that he does not use drugs.    Home Medications:  furosemide, rosuvastatin, and tamsulosin    Allergies:  No Known Allergies    Objective    Objective     Vitals:   Temp:  [97.6 °F (36.4 °C)] 97.6 °F (36.4 °C)  Heart Rate:  [] 89  Resp:  [24] 24  BP: ()/(29-99) 101/69  Flow (L/min):  [6] 6  FiO2 (%):  [70 %-95 %] 70 %    Physical Exam  Vitals reviewed.   Constitutional:       General: He is in acute distress.      Appearance: He is ill-appearing and diaphoretic.      Comments: Patient was ashen in color upon arrival.   HENT:      Head: Atraumatic.      Mouth/Throat:      Mouth: Mucous membranes are moist.      Pharynx: Oropharynx is clear. No oropharyngeal exudate.   Eyes:      General: No scleral icterus.     Conjunctiva/sclera: Conjunctivae normal.   Neck:      Vascular: JVD present. No carotid bruit.   Cardiovascular:      Rate and Rhythm: Normal rate and regular rhythm. Occasional extrasystoles are present.     Chest Wall: PMI is not displaced.      Pulses:           Radial pulses are 2+ on the right side and 2+ on the left side.      Heart sounds: S1 normal and S2 normal. Murmur heard.    Systolic murmur is present with a grade of 1/6.    No friction rub. No gallop.   Pulmonary:      Effort: Pulmonary effort is normal. Tachypnea present. No respiratory distress.      Breath sounds: Examination of the right-lower field reveals decreased breath sounds. Examination of the left-lower field reveals decreased breath sounds. Decreased breath sounds present. No wheezing or rhonchi.      Comments: Bibasilar crackles present.  Chest:      Chest wall: No tenderness.   Abdominal:      General: Bowel sounds are normal. There is no distension.      Palpations: Abdomen is soft. There  is no mass.      Tenderness: There is no abdominal tenderness.      Comments: Obese.   Musculoskeletal:         General: No swelling, tenderness or deformity.      Cervical back: Neck supple. No tenderness.      Right lower leg: No edema.      Left lower leg: No edema.   Skin:     General: Skin is warm.      Coloration: Skin is not jaundiced.      Findings: No erythema or rash.      Nails: There is no clubbing.   Neurological:      General: No focal deficit present.      Mental Status: He is alert and oriented to person, place, and time.      Motor: No weakness.   Psychiatric:         Mood and Affect: Mood normal.         Behavior: Behavior normal.       Result Review    Result Review:  I have personally reviewed the results from the time of this admission to 3/6/2023 18:24 EST and agree with these findings:  [x]  Laboratory  []  Microbiology  [x]  Radiology  [x]  EKG/Telemetry   []  Cardiology/Vascular   []  Pathology  []  Old records  []  Other:  Most notable findings include: Cr = 1.11, Na = 137, K = 3.7, glucose = 254, Hgb = 12.5, lactic acid = 6.8  AB.261 / 44.0 / 297.6 / 19.3 / 99% on mechanical ventilation    CXR 3/6/23:  FINDINGS:          Endotracheal tube is in place.  The tip is 6 cm above the durga.  NG tube can be followed 10 cm   beyond the GE junction, the tip is not included.  The heart is borderline in size.  The hilar structures are obscured.  Bilateral pulmonary   infiltrate or edema is evident.  Bony structures appear intact.  IMPRESSION:  ET tube and NG tube in place.  Borderline cardiomegaly.     Bilateral pulmonary infiltrate or edema is evident.      Assessment & Plan   Assessment / Plan     Brief Patient Summary:  Vince Jain is a 72 y.o. male with:  -Acute anterior/anterolateral STEMI, s/p emergent PCI to the culprit mid LAD  -Cardiogenic shock, s/p Impella CP LVAD placement and removal, currently requiring vasopressor support with norepinephrine and inotropic support with  dobutamine  -Ischemic cardiomyopathy, severe   -Acute respiratory failure requiring mechanical ventilation  -Acute cardiogenic pulmonary edema   -Lactic acidosis   -History of CVA     Active Hospital Problems:  Active Hospital Problems    Diagnosis    • **STEMI involving left anterior descending coronary artery (HCC)    • Cardiogenic shock (HCC)    • S/P coronary artery stent placement        Plan:   -Continue vasopressor support with Levophed (weaning as tolerated) and inotropic therapy with dobutamine at 5 mcg/kg/min  -Continue DAPT with aspirin and ticagrelor for a minimum of one year, as well as high-intensity statin therapy  -Optimize medical therapy for severe LV dysfunction once Mr. Jain is off pressors and more hemodynamically stable, but no beta blocker for now  -Wean mechanical ventilation as tolerated  -Gentle diuresis with IV Lasix   -Follow serial H/H and monitor lytes and renal function closely  -Check an echocardiogram in the morning   -Further plans based on hospital course  -The patient's findings and prognosis were discussed in great detail with his family members       DVT prophylaxis:  No DVT prophylaxis order currently exists.    CODE STATUS:    Level Of Support Discussed With: Patient  Code Status (Patient has no pulse and is not breathing): CPR (Attempt to Resuscitate)  Medical Interventions (Patient has pulse or is breathing): Full Support  Release to patient: Routine Release    Electronically signed by Sanchez Zaman MD, 03/06/23, 6:24 PM EST.

## 2023-03-06 NOTE — PAYOR COMM NOTE
"Vince Jain (72 y.o. Male)     INPATIENT AUTH REQUEST-- Initial Clinicals  Admitted from ED.   EMERGENT ADMISSION  DOS: 3/6/2023.     DX: Cardiogenic Shock R57.0, STEMI I21.3; Hypotension I95.9.       Date of Birth   1950    Social Security Number       Address   [address] [place] [address]    Home Phone   [phone]    MRN   [id number]       Nondenominational   Jainism    Marital Status                               Admission Date   3/6/23    Admission Type       Admitting Provider   Sanchez Zaman MD    Attending Provider   Sanchez Zaman MD    Department, Room/Bed   [facility] CORONARY CARE UNIT, C08/1       Discharge Date       Discharge Disposition       Discharge Destination                               Attending Provider: Sanchez Zaman MD    Allergies: No Known Allergies    Isolation: None   Infection: None   Code Status: CPR    Ht: 165.1 cm (65\")   Wt: 95.6 kg (210 lb 12.2 oz)    Admission Cmt: None   Principal Problem: STEMI involving left anterior descending coronary artery (HCC) [I21.02]                 Active Insurance as of 3/6/2023     Primary Coverage     Payor Plan Insurance Group Employer/Plan Group    [employer] MEDICARE REPLACEMENT AARP/[facility] MEDICARE ADVANTAGE-OPTUM CARE [id number]     Payor Plan Address Payor Plan Phone Number Payor Plan Fax Number Effective Dates    [address] [phone]  10/1/2022 - None Entered    [facility] [id number]       Subscriber Name Subscriber Birth Date Member ID       VINCE JAIN 1950 [id number]                 Emergency Contacts      (Rel.) Home Phone Work Phone Mobile Phone    MATTHEW ALFONSO (Daughter) -- -- [phone]    JANE PATTERSON (Daughter) -- -- [phone]    XAVIER JAIN (Spouse) [phone] -- [phone]            [place]: NPI [id number] Tax ID [id number]        Multiple Illness GRG - Clinical Indications for Admission to Inpatient " Care by Leila Rangel RN       Met: Reviewed on 3/6/2023 by Leila Rangel RN       Created Using Review Status Review Entered   Indicia® Completed 3/6/2023 1659       Created By   Leila Rangel RN       Criteria Set Name - Subset   Multiple Illness GRG - Clinical Indications for Admission to Inpatient Care      Criteria Review      Clinical Indications for Admission to Inpatient Care    Most Recent : Leila Rangel Most Recent Date: 3/6/2023 16:59:33 EST    (X) Hospital admission is indicated for patient with multiple conditions [A] or multiple organ    dysfunction [B] and  1 or more  of the following :       (X) Hemodynamic instability, as indicated by  1 or more  of the following  (2) (3) (4) (5) (6):          (X) Vital sign abnormality not readily corrected by appropriate treatment, as indicated by  1          or more  of the following  [C]:             (X) Hypotension that persists despite appropriate treatment (eg, volume repletion, treatment of             underlying cause)          (X) Hypotension that is severe, as indicated by  ALL  of the following :             (X) Hypotension             (X) Severe hypotension, as indicated by  1 or more  of the following :                (X) Lactate of 2.0 mmol/L (18 mg/dL) or more secondary to hypotension (ie, hypoperfusion) [D]                (4)                (X) IV inotropic or vasopressor medication required to maintain adequate blood pressure or perfusion                3/6/2023 16:59:33 EST by Leila Rangel                  Requires Levophed IV to keep SBP > 90.       (X) Acute cardiac or peripheral ischemia, as indicated by  1 or more  of the following :          (X) Acute coronary syndrome (16) (17) (18) (19)          3/6/2023 16:59:33 EST by Leila Rangel            STEMI dx. Multiple leads w/ST elevation.       (X) Severe respiratory findings (not responsive to observation care as appropriate), including        1 or more  of the following  (32) (33)  (34):          (X) Respiratory distress, as indicated by  ALL  of the following  (33) (35):             (X) Patient with  1 or more  of the following :                (X) Dyspnea (difficulty breathing)             (X) Evidence of respiratory compromise, as indicated by  1 or more  of the following :                (X) Hypoxemia                3/6/2023 16:59:33 EST by Leila Rangel                  Sats 88% on 6L NC.       (X) Impending or actual respiratory arrest. See Respiratory Failure GRG guideline for severe respiratory       disease and long-term mechanical ventilation patients. (41) (42) (43)       3/6/2023 16:59:33 EST by Leila Rangel         Intubated. On vent       Definitions    Hypotension    (X) Hypotension, as indicated by  ALL  of the following  (1) (2) (3) (4):       (X) Not patient baseline (eg, healthy adult with low SBP) or intentional therapeutic goal (eg,       low SBP as treatment goal in heart failure)       (X) Low blood pressure, as indicated by  1 or more  of the following :          (X) SBP less than 90 mm Hg in adult or child 10 years or older [A]       Hypoxemia    (X) Hypoxemia, as indicated by  1 or more  of the following  (1):       (X) Patient without baseline need for supplemental oxygen with oxygen saturation (SaO2) of less       than 90% or arterial blood gas partial pressure of oxygen (PO2) of less than 60 mm Hg (8.0       kPa) on room air [A] [B]       Notes:    3/6/2023 16:59:33 EST by Leila Rangel    Subject: Admission    To ED by EMS for CP. Pt had syncopal episode while laying down & this was witnessed by EMS. EMS also transmitted EKG which showed ST elevation in multiple leads. Cath lab notified. Upon arrival to ED, Cath lab was ready. EMS took pt straight there.  -123. BP 72/49.      PMHx: HTN; HLD, CVA.      ED results: Glucose 254; Calcium 7.9; Lactate 2.79; ABGs (on vent) pH 7.26; CO2 44,  HCO3 19.3;              To Cath lab for L heart cath, PCI, Intravascular  Ultrasound & Percutaneous Manual Thrombectomy.      While in Cath lab, pt intubated.      Pt received 1000ml IV NS per EMS. In Cath lab, was started on Levophed IV drip. Intubated/Vent. Propofol IV drip. IV NS @ 50/hr. Heparin IV,      Cath lab report is PENDING.              Admit: Critical Care. Post-cath protocol, Silva cath, NPO, IS STILL INTUBATED/VENT, Cangrelor IV; Brilinta PO BID, Morphine IV prn; Zofran IV prn; continue Levophed IV drip; Dobutrex IV drip, ASA qd. Labs in AM.                   Emergency Department Notes      Eliot Carmona DO at 03/06/23 1149          Time: 11:49 AM EST  Date of encounter:  3/6/2023  Independent Historian/Clinical History and Information was obtained by:   EMS  Chief Complaint: Chest pain    History is limited by: Acuity of Condition    History of Present Illness:  Patient is a 72 y.o. year old male who presents to the emergency department for evaluation of chest pain.  Patient presents to [facility] via EMS for evaluation treatment of chest pain.  Chest pain began at around 730 or so this morning.  Patient reported midsternal chest pain.  Patient called 911.  EMS states that when they were putting the patient on the cot he had a syncopal episode.  EMS performed by EMS was concerning for ST elevation MI.  EKG was transmitted to the ED and shown to me.  Patient was transported emergently to the ER for further evaluation.    HPI    Patient Care Team  Primary Care Provider: Yady Schneider DO    Past Medical History:     No Known Allergies  Past Medical History:   Diagnosis Date   • Cataract    • Glaucoma    • Head injury     x2 1st 15yrs ago and last 2yrs ago   • Hernia, inguinal     right   • Hyperlipidemia    • Hypertension    • Prostatitis    • Stroke (HCC)     unsure was told he had a little one     Past Surgical History:   Procedure Laterality Date   • CATARACT EXTRACTION Right    • HERNIA REPAIR Right    • TONSILLECTOMY     • VEIN SURGERY Left      Family History  "  Problem Relation Age of Onset   • Stroke Mother    • Cancer Father        Home Medications:  Prior to Admission medications    Medication Sig Start Date End Date Taking? Authorizing Provider   ciprofloxacin (Cipro) 500 MG tablet 1 tablet by mouth 2 times a day starting day before biopsy 2/9/23   Eve Espinoza, APRN   furosemide (Lasix) 20 MG tablet Take 2 tablets by mouth Daily As Needed (fluid retention). Prn , unsure the name of water pill he takes 2/7/23   Yady Schneider DO   rosuvastatin (Crestor) 20 MG tablet Take 1 tablet by mouth Daily. 2/7/23   Yady Schneider DO   tamsulosin (FLOMAX) 0.4 MG capsule 24 hr capsule TAKE 1 CAPSULE BY MOUTH DAILY 1/9/23   Yady Schneider DO        Social History:   Social History     Tobacco Use   • Smoking status: Never     Passive exposure: Never   • Smokeless tobacco: Never   • Tobacco comments:     No second hand smoke exposure    Vaping Use   • Vaping Use: Never used   Substance Use Topics   • Alcohol use: Not Currently     Comment: rarely   • Drug use: Never         Review of Systems:  Review of Systems   Constitutional: Negative for chills and fever.   HENT: Negative for congestion, ear pain and sore throat.    Eyes: Negative for pain.   Respiratory: Negative for cough, chest tightness and shortness of breath.    Cardiovascular: Positive for chest pain.   Gastrointestinal: Negative for abdominal pain, diarrhea, nausea and vomiting.   Genitourinary: Negative for flank pain and hematuria.   Musculoskeletal: Negative for joint swelling.   Skin: Negative for pallor.   Neurological: Negative for seizures and headaches.   All other systems reviewed and are negative.       Physical Exam:  /88   Pulse 99   Temp 97.6 °F (36.4 °C) (Axillary)   Resp 24   Ht 165.1 cm (65\")   Wt 95.6 kg (210 lb 12.2 oz)   SpO2 (!) 88%   BMI 35.07 kg/m²     Physical Exam      Vital signs were reviewed on EMS cot.  Patient was noted be hypotensive.  ED nursing staff did not obtain vital " signs.  General appearance - Patient appears well-developed and well-nourished.  Patient is an acute distress.  Patient was ashen in color.  Patient is mildly diaphoretic.  Head - Normocephalic, atraumatic.  Pupils - Equal, round, reactive to light.  Extraocular muscles are intact.  Conjunctive is clear.  Nasal - Normal inspection.  No evidence of trauma or epistaxis.  Tympanic membranes - Gray, intact without erythema or retractions.  Oral mucosa - Pink and moist without lesions or erythema.  Uvula is midline.  Chest wall - Atraumatic.  Chest wall is nontender.  There is no vesicular rashes noted.  Neck - Supple.  Trachea was midline.  There is no palpable lymphadenopathy or thyromegaly.  There are no meningeal signs  Lungs - Clear to auscultation and percussion bilaterally.  Heart - Regular rate and rhythm without any murmurs, clicks, or gallops.  Abdomen - Soft.  Bowel sounds are present.  There is no palpable tenderness.  There is no rebound, guarding, or rigidity.  There are no palpable masses.  There are no pulsatile masses.  Back - Spine is straight and midline.  There is no CVA tenderness.  Extremities - Intact x4 with full range of motion.  There is no palpable edema.  Pulses are intact x4 and equal.  Neurologic - Patient is awake, alert, and oriented x3.  Cranial nerves II through XII are grossly intact.  Motor and sensory functions grossly intact.  Cerebellar function was normal.  Integument - There are no rashes.  There are no petechia or purpura lesions noted.  There are no vesicular lesions noted.        Procedures:  Procedures      Medical Decision Making:      Comorbidities that affect care:    CVA, Coronary Artery Disease, Hypertension    External Notes reviewed:    EMS Run sheet: EMS vital signs were reviewed.  Patient is noted to be in cardiogenic shock.  Patient was given liter of IV fluids in route.      The following orders were placed and all results were independently analyzed by me:  Orders  Placed This Encounter   Procedures   • Basic Metabolic Panel   • Blood Gas, Arterial -With Co-Ox Panel: Yes   • CBC Auto Differential   • CBC (No Diff)   • Basic Metabolic Panel   • High Sensitivity Troponin T   • Hemoglobin A1c   • Lipid Panel   • Hemoglobin & Hematocrit, Blood   • ABG with Co-Ox and Electrolytes   • NPO Diet NPO Type: Strict NPO   • Vital Signs and Check distal extremity for warmth, color, sensation and pulses with each vital sign and site check.   • Cardiac Monitoring   • Change site dressing   • Obtain STAT EKG during chest pain. Notify MD of any change in rhythm, ST segments or complaints of chest pain.   • Encourage fluids   • Strict intake and output   • Activity - Strict Bed Rest   • Assess Puncture Site, Vital SIgns, Distal Pulses & Observe Site for Bleeding or Swelling   • Remove Femoral / Brachial Sheaths   • Apply Femostop   • Head of Bed: Flat; Activity Level: Strict Bed Rest; Keep Affected Extremity/ Extremities Straight; Total Bedrest Time? Other; Length of Time: Continuous bedrest - patient has Impella and is intubated   • Notify MD if platelet count is less than 100,000, is less than 1/2 baseline, or if Hgb drops by more than 3mg/dl.   • Notify MD of hypotension (SBP less than 95), bleeding, or dysrythmia and follow Sheath Removal Policy if needed.   • Hold metFORMIN (GLUCOPHAGE) for 48 Hours   • Continue Indwelling Urinary Catheter   • Assess Need for Indwelling Urinary Catheter - Follow Removal Protocol   • Urinary Catheter Care   • Code Status and Medical Interventions:   • Cardiac Rehab Evaluation and Enrollment   • Oxygen Therapy- Nasal Cannula; 2 LPM; Titrate for SPO2: 90%   • POC Activated Clotting Time   • POC Activated Clotting Time   • POC Activated Clotting Time   • POC Activated Clotting Time   • POC Activated Clotting Time   • POC Activated Clotting Time   • POC Activated Clotting Time   • POC Activated Clotting Time   • POC Activated Clotting Time   • POC Activated  Clotting Time   • POC Activated Clotting Time   • POC Activated Clotting Time   • POC Activated Clotting Time   • POC Activated Clotting Time   • EP/CRM Study   • ECG 12 Lead   • ECG 12 Lead   • Inpatient Admission   • CBC & Differential       Medications Given in the Emergency Department:  Medications   rocuronium (ZEMURON) injection 100 mg (has no administration in time range)   sodium chloride 0.9 % infusion (has no administration in time range)   acetaminophen (TYLENOL) tablet 650 mg (has no administration in time range)   Morphine injection 1 mg (has no administration in time range)     And   naloxone (NARCAN) injection 0.4 mg (has no administration in time range)   ondansetron (ZOFRAN) tablet 4 mg (has no administration in time range)     Or   ondansetron (ZOFRAN) injection 4 mg (has no administration in time range)   aspirin EC tablet 81 mg (has no administration in time range)   ticagrelor (BRILINTA) tablet 90 mg (has no administration in time range)   metoprolol tartrate (LOPRESSOR) tablet 25 mg (has no administration in time range)   rosuvastatin (CRESTOR) tablet 40 mg (has no administration in time range)   iopamidol (ISOVUE-370) 76 % injection (453 mL Intravenous Given 3/6/23 1400)   norepinephrine (LEVOPHED) 8 mg in 250 mL NS infusion (premix) (has no administration in time range)   norepinephrine (LEVOPHED) 8 mg in 250 mL NS infusion (premix) (0.6 mcg/kg/min × 96 kg Intravenous New Bag 3/6/23 1423)   cangrelor 50 mg in 250 ml (200 mcg/ml) IV infusion (4 mcg/kg/min × 96 kg Intravenous Rate/Dose Change 3/6/23 1200)   ceFAZolin in 0.9% normal saline (ANCEF) IVPB solution 2 g (0 g Intravenous Stopped 3/6/23 1140)   propofol (DIPRIVAN) infusion 10 mg/mL 100 mL (20 mcg/kg/min × 96 kg Intravenous Rate/Dose Change 3/6/23 1145)        ED Course:     [facility] picked up the patient from his local residence.  Twelve-lead EKG transmitted to [facility] ER was interpreted by me.  Patient was noted  to have ST elevation MI in progress.  Cath Lab/STEMI alert was made by me.  I did discuss case with Dr. Zaman.  EMS also called me for direct medical control.  Patient was ordered a liter IV fluids for his cardiogenic shock.  Patient was transported emergently to the ER.  Upon arrival to the ER the Cath Lab was ready for the patient the patient was taken directly to the Cath Lab.  I follow the patient from the EMS entrance to the Cath Lab.  Report was given to Dr. Zaman.  Patient did report that he is Mandaeism and does not want any blood products.    Labs:    Lab Results (last 24 hours)     Procedure Component Value Units Date/Time    CBC & Differential [[id number]]  (Abnormal) Collected: 03/06/23 0927    Specimen: Blood Updated: 03/06/23 0958    Narrative:      The following orders were created for panel order CBC & Differential.  Procedure                               Abnormality         Status                     ---------                               -----------         ------                     CBC Auto Differential[[id number]]        Abnormal            Final result                 Please view results for these tests on the individual orders.    Basic Metabolic Panel [[id number]]  (Abnormal) Collected: 03/06/23 0927    Specimen: Blood Updated: 03/06/23 1022     Glucose 254 mg/dL      BUN 18 mg/dL      Creatinine 1.11 mg/dL      Sodium 137 mmol/L      Potassium 3.7 mmol/L      Chloride 105 mmol/L      CO2 20.3 mmol/L      Calcium 7.9 mg/dL      BUN/Creatinine Ratio 16.2     Anion Gap 11.7 mmol/L      eGFR 71.4 mL/min/1.73     Narrative:      GFR Normal >60  Chronic Kidney Disease <60  Kidney Failure <15      CBC Auto Differential [[id number]]  (Abnormal) Collected: 03/06/23 0927    Specimen: Blood Updated: 03/06/23 0958     WBC 4.21 10*3/mm3      RBC 4.31 10*6/mm3      Hemoglobin 12.0 g/dL      Hematocrit 36.7 %      MCV 85.2 fL      MCH 27.8 pg      MCHC 32.7 g/dL      RDW 13.7 %      RDW-SD 42.7 fl       MPV 9.8 fL      Platelets 191 10*3/mm3      Neutrophil % 52.6 %      Lymphocyte % 34.7 %      Monocyte % 11.2 %      Eosinophil % 0.5 %      Basophil % 0.5 %      Immature Grans % 0.5 %      Neutrophils, Absolute 2.22 10*3/mm3      Lymphocytes, Absolute 1.46 10*3/mm3      Monocytes, Absolute 0.47 10*3/mm3      Eosinophils, Absolute 0.02 10*3/mm3      Basophils, Absolute 0.02 10*3/mm3      Immature Grans, Absolute 0.02 10*3/mm3      nRBC 0.0 /100 WBC     POC Activated Clotting Time [[id number]]  (Abnormal) Collected: 03/06/23 0947    Specimen: Blood Updated: 03/06/23 0954     Activated Clotting Time  197 Seconds      Comment: Serial Number: [id number]:  [id number]       POC Activated Clotting Time [[id number]]  (Abnormal) Collected: 03/06/23 1001    Specimen: Blood Updated: 03/06/23 1009     Activated Clotting Time  245 Seconds      Comment: Serial Number: [id number]:  [id number]       POC Activated Clotting Time [[id number]]  (Abnormal) Collected: 03/06/23 1024    Specimen: Blood Updated: 03/06/23 1030     Activated Clotting Time  311 Seconds      Comment: Serial Number: [id number]:  [id number]       POC Activated Clotting Time [[id number]]  (Abnormal) Collected: 03/06/23 1055    Specimen: Blood Updated: 03/06/23 1102     Activated Clotting Time  335 Seconds      Comment: Serial Number: [id number]:  [id number]       POC Activated Clotting Time [[id number]]  (Abnormal) Collected: 03/06/23 1135    Specimen: Blood Updated: 03/06/23 1141     Activated Clotting Time  275 Seconds      Comment: Serial Number: [id number]:  [id number]       POC Activated Clotting Time [[id number]]  (Abnormal) Collected: 03/06/23 1206    Specimen: Blood Updated: 03/06/23 1212     Activated Clotting Time  275 Seconds      Comment: Serial Number: [id number]:  [id number]       POC Activated Clotting Time [[id number]]  (Abnormal) Collected: 03/06/23 1326    Specimen: Blood Updated: 03/06/23 1332     Activated Clotting Time  227 Seconds      Comment:  Serial Number: [id number]:  [id number]       Hemoglobin & Hematocrit, Blood [[id number]]  (Abnormal) Collected: 03/06/23 1412    Specimen: Blood Updated: 03/06/23 1433     Hemoglobin 12.5 g/dL      Hematocrit 39.7 %     ABG with Co-Ox and Electrolytes [[id number]]  (Abnormal) Collected: 03/06/23 1500    Specimen: Arterial Blood Updated: 03/06/23 1504     pH, Arterial 7.261 pH units      pCO2, Arterial 44.0 mm Hg      pO2, Arterial 297.6 mm Hg      HCO3, Arterial 19.3 mmol/L      Base Excess, Arterial -7.5 mmol/L      O2 Saturation, Arterial 99.0 %      Hemoglobin, Blood Gas 13.1 g/dL      Carboxyhemoglobin 0.1 %      Methemoglobin 0.30 %      Oxyhemoglobin 98.6 %      FHHB 1.0 %      Tyler's Test Positive     Note  RR24 P+5     Site Arterial: left radial     Modality AC/VC     FIO2 95 %      Sodium, Arterial 136.6 mmol/L      Potassium, Arterial 5.22 mmol/L      Ionized Calcium, Arterial 1.03 mmol/L      Chloride, Arterial 107 mmol/L      Glucose, Arterial 309 mg/dL      Lactate, Arterial 2.79 mmol/L      PO2/FIO2 313           Imaging:    No Radiology Exams Resulted Within Past 24 Hours      Differential Diagnosis and Discussion:    Chest Pain:  Based on the patient's signs and symptoms, I considered aortic dissection, myocardial infaction, pulmonary embolism, cardiac tamponade, pericarditis, pneumothorax, musculoskeletal chest pain and other differential diagnosis as an etiology of the patient's chest pain.     EKG was interpreted by me.  EMS EKG was interpreted by me at 851 to show a sinus bradycardia with a ventricular rate of 48 bpm.  P waves are normal.  QRS interval was widened 150 ms with what appears to be a right bundle branch block.  Axis is -81 degrees.  Patient noted to have significant ST segment elevation in leads V2, V3, V4, V5 as well as leads I and aVL.  This is concerning for an LAD ST elevation MI.  No old EKGs were available for comparison at this time.    [facility]     Critical Care Note: Total  Critical Care time of 30 minutes. Total critical care time documented does not include time spent on separately billed procedures for services of nurses or physician assistants. I personally saw and examined the patient. I have reviewed all diagnostic interpretations and treatment plans as written. I was present for the key portions of any procedures performed and the inclusive time noted in any critical care statement. Critical care time includes patient management by me, time spent at the patients bedside,  time to review lab and imaging results, discussing patient care, documentation in the medical record, and time spent with family or caregiver.    Patient Care Considerations:          Consultants/Shared Management Plan:    Consultant: I have discussed the case with Dr. Sanchez Zaman who states Agreed with Cath Lab activation and will take the patient emergently to the Cath Lab for PCTA intervention.    Social Determinants of Health:    Patient is independent, reliable, and has access to care.       Disposition and Care Coordination:    Admit:   Through independent evaluation of the patient's history, physical, and imperical data, the patient meets criteria for observation/admission to the hospital.        Final diagnoses:   ST elevation myocardial infarction (STEMI), unspecified artery (HCC)   Cardiogenic shock (HCC)        ED Disposition     ED Disposition   Decision to Admit    Condition   --    Comment   --             This medical record created using voice recognition software.           Eliot Carmona DO  03/06/23 1604      Electronically signed by Eliot Carmona DO at 03/06/23 1604         Current Facility-Administered Medications   Medication Dose Route Frequency Provider Last Rate Last Admin   • acetaminophen (TYLENOL) tablet 650 mg  650 mg Oral Q4H PRN Sanchez Zaman MD       • [START ON 3/7/2023] aspirin EC tablet 81 mg  81 mg Oral Daily Sanchez Zaman MD       • DOBUTamine (DOBUTREX) 500 mg in 250  mL D5W infusion  2.5 mcg/kg/min Intravenous Titrated Mindy Triana MD 7.17 mL/hr at 03/06/23 1712 2.5 mcg/kg/min at 03/06/23 1712   • iopamidol (ISOVUE-370) 76 % injection    Code / Trauma / Sedation Medication Sanchez Zaman MD   453 mL at 03/06/23 1400   • Morphine injection 1 mg  1 mg Intravenous Q4H PRN Sanchez Zaman MD        And   • naloxone (NARCAN) injection 0.4 mg  0.4 mg Intravenous Q5 Min PRN Sanchez Zaman MD       • norepinephrine (LEVOPHED) 8 mg in 250 mL NS infusion (premix)  0.02-0.3 mcg/kg/min Intravenous Titrated Mindy Triana MD 90 mL/hr at 03/06/23 1717 0.5 mcg/kg/min at 03/06/23 1717   • ondansetron (ZOFRAN) tablet 4 mg  4 mg Oral Q6H PRN Sanchez Zaman MD        Or   • ondansetron (ZOFRAN) injection 4 mg  4 mg Intravenous Q6H PRN Sanchez Zaman MD       • propofol (DIPRIVAN) infusion 10 mg/mL 100 mL  5-50 mcg/kg/min Intravenous Titrated Mindy Triana MD 5.74 mL/hr at 03/06/23 1636 10 mcg/kg/min at 03/06/23 1636   • rocuronium (ZEMURON) injection 100 mg  1,000 mcg/kg Intravenous Once Sanchez Zaman MD       • rosuvastatin (CRESTOR) tablet 40 mg  40 mg Nasogastric Nightly Sanchez Zaman MD       • sodium chloride 0.9 % infusion  50 mL/hr Intravenous Continuous Sanchez Zaman MD 50 mL/hr at 03/06/23 1621 50 mL/hr at 03/06/23 1621   • ticagrelor (BRILINTA) tablet 90 mg  90 mg Oral BID Sanchez Zaman MD           Lab Results (last 24 hours)     Procedure Component Value Units Date/Time    ABG with Co-Ox and Electrolytes [[id number]]  (Abnormal) Collected: 03/06/23 1500    Specimen: Arterial Blood Updated: 03/06/23 1504     pH, Arterial 7.261 pH units      pCO2, Arterial 44.0 mm Hg      pO2, Arterial 297.6 mm Hg      HCO3, Arterial 19.3 mmol/L      Base Excess, Arterial -7.5 mmol/L      O2 Saturation, Arterial 99.0 %      Hemoglobin, Blood Gas 13.1 g/dL      Carboxyhemoglobin 0.1 %      Methemoglobin 0.30 %      Oxyhemoglobin 98.6 %      FHHB 1.0 %       Tyler's Test Positive     Note  RR24 P+5     Site Arterial: left radial     Modality AC/VC     FIO2 95 %      Sodium, Arterial 136.6 mmol/L      Potassium, Arterial 5.22 mmol/L      Ionized Calcium, Arterial 1.03 mmol/L      Chloride, Arterial 107 mmol/L      Glucose, Arterial 309 mg/dL      Lactate, Arterial 2.79 mmol/L      PO2/FIO2 313    Hemoglobin & Hematocrit, Blood [[id number]]  (Abnormal) Collected: 03/06/23 1412    Specimen: Blood Updated: 03/06/23 1433     Hemoglobin 12.5 g/dL      Hematocrit 39.7 %     POC Activated Clotting Time [[id number]]  (Abnormal) Collected: 03/06/23 1326    Specimen: Blood Updated: 03/06/23 1332     Activated Clotting Time  227 Seconds      Comment: Serial Number: [id number]:  [id number]       POC Activated Clotting Time [[id number]]  (Abnormal) Collected: 03/06/23 1206    Specimen: Blood Updated: 03/06/23 1212     Activated Clotting Time  275 Seconds      Comment: Serial Number: [id number]:  [id number]       POC Activated Clotting Time [[id number]]  (Abnormal) Collected: 03/06/23 1135    Specimen: Blood Updated: 03/06/23 1141     Activated Clotting Time  275 Seconds      Comment: Serial Number: [id number]:  [id number]       POC Activated Clotting Time [[id number]]  (Abnormal) Collected: 03/06/23 1055    Specimen: Blood Updated: 03/06/23 1102     Activated Clotting Time  335 Seconds      Comment: Serial Number: [id number]:  [id number]       POC Activated Clotting Time [[id number]]  (Abnormal) Collected: 03/06/23 1024    Specimen: Blood Updated: 03/06/23 1030     Activated Clotting Time  311 Seconds      Comment: Serial Number: [id number]:  [id number]       Basic Metabolic Panel [[id number]]  (Abnormal) Collected: 03/06/23 0927    Specimen: Blood Updated: 03/06/23 1022     Glucose 254 mg/dL      BUN 18 mg/dL      Creatinine 1.11 mg/dL      Sodium 137 mmol/L      Potassium 3.7 mmol/L      Chloride 105 mmol/L      CO2 20.3 mmol/L      Calcium 7.9 mg/dL      BUN/Creatinine Ratio 16.2      Anion Gap 11.7 mmol/L      eGFR 71.4 mL/min/1.73     Narrative:      GFR Normal >60  Chronic Kidney Disease <60  Kidney Failure <15      POC Activated Clotting Time [[id number]]  (Abnormal) Collected: 03/06/23 1001    Specimen: Blood Updated: 03/06/23 1009     Activated Clotting Time  245 Seconds      Comment: Serial Number: [id number]:  [id number]       CBC & Differential [[id number]]  (Abnormal) Collected: 03/06/23 0927    Specimen: Blood Updated: 03/06/23 0958    Narrative:      The following orders were created for panel order CBC & Differential.  Procedure                               Abnormality         Status                     ---------                               -----------         ------                     CBC Auto Differential[[id number]]        Abnormal            Final result                 Please view results for these tests on the individual orders.    CBC Auto Differential [[id number]]  (Abnormal) Collected: 03/06/23 0927    Specimen: Blood Updated: 03/06/23 0958     WBC 4.21 10*3/mm3      RBC 4.31 10*6/mm3      Hemoglobin 12.0 g/dL      Hematocrit 36.7 %      MCV 85.2 fL      MCH 27.8 pg      MCHC 32.7 g/dL      RDW 13.7 %      RDW-SD 42.7 fl      MPV 9.8 fL      Platelets 191 10*3/mm3      Neutrophil % 52.6 %      Lymphocyte % 34.7 %      Monocyte % 11.2 %      Eosinophil % 0.5 %      Basophil % 0.5 %      Immature Grans % 0.5 %      Neutrophils, Absolute 2.22 10*3/mm3      Lymphocytes, Absolute 1.46 10*3/mm3      Monocytes, Absolute 0.47 10*3/mm3      Eosinophils, Absolute 0.02 10*3/mm3      Basophils, Absolute 0.02 10*3/mm3      Immature Grans, Absolute 0.02 10*3/mm3      nRBC 0.0 /100 WBC     POC Activated Clotting Time [[id number]]  (Abnormal) Collected: 03/06/23 0947    Specimen: Blood Updated: 03/06/23 0954     Activated Clotting Time  197 Seconds      Comment: Serial Number: [id number]:  [id number]           Imaging Results (Last 24 Hours)     Procedure Component Value Units Date/Time     XR Chest 1 View [[id number]] Resulted: 03/06/23 1717     Updated: 03/06/23 1718        ECG/EMG Results (last 24 hours)     Procedure Component Value Units Date/Time    EP/CRM Study [[id number]] Resulted: 03/06/23 1244     Updated: 03/06/23 1436          Orders (last 24 hrs)      Start     Ordered    03/07/23 0900  aspirin EC tablet 81 mg  Daily         03/06/23 1457    03/07/23 0700  ECG 12 Lead  Once         03/06/23 1457    03/07/23 0600  CBC (No Diff)  Morning Draw         03/06/23 1457    03/07/23 0600  Basic Metabolic Panel  Morning Draw         03/06/23 1457    03/07/23 0600  High Sensitivity Troponin T  Morning Draw         03/06/23 1457    03/07/23 0600  Hemoglobin A1c  Morning Draw         03/06/23 1457    03/07/23 0600  Lipid Panel  Morning Draw        Comments: Fasting      03/06/23 1457    03/07/23 0600  Magnesium  Morning Draw         03/06/23 1659    03/06/23 2100  ticagrelor (BRILINTA) tablet 90 mg  2 Times Daily         03/06/23 1457    03/06/23 2100  metoprolol tartrate (LOPRESSOR) tablet 25 mg  Every 12 Hours Scheduled,   Status:  Discontinued         03/06/23 1457    03/06/23 2100  rosuvastatin (CRESTOR) tablet 40 mg  Nightly         03/06/23 1457    03/06/23 1900  Blood Gas, Arterial -With Co-Ox Panel: Yes  Once         03/06/23 1616    03/06/23 1800  Lactic Acid, Plasma  Every 6 Hours       03/06/23 1659    03/06/23 1730  propofol (DIPRIVAN) infusion 10 mg/mL 100 mL  Titrated         03/06/23 1636    03/06/23 1730  DOBUTamine (DOBUTREX) 500 mg in 250 mL D5W infusion  Titrated         03/06/23 1644    03/06/23 1659  XR Chest 1 View  1 Time Imaging         03/06/23 1658    03/06/23 1659  Ventilator - AC/VC; Other; 24; SpO2 >/= 90%; Other; 7; mL; 450  Continuous         03/06/23 1659    03/06/23 1600  Strict intake and output  Every 4 Hours       03/06/23 1244    03/06/23 1600  norepinephrine (LEVOPHED) 8 mg in 250 mL NS infusion (premix)  Titrated         03/06/23 1510    03/06/23 1559  Continue  Indwelling Urinary Catheter  Once        See Hyperspace for full Linked Orders Report.    03/06/23 1601    03/06/23 1559  Assess Need for Indwelling Urinary Catheter - Follow Removal Protocol  Continuous        Comments: Follow Protocol As Outlined in Process Instructions (Open Order Report to View Full Instructions)   See Hyperspace for full Linked Orders Report.    03/06/23 1601    03/06/23 1559  Urinary Catheter Care  Every Shift      See Hyperspace for full Linked Orders Report.    03/06/23 1601    03/06/23 1458  Code Status and Medical Interventions:  Continuous         03/06/23 1457    03/06/23 1458  NPO Diet NPO Type: Strict NPO  Diet Effective Now         03/06/23 1457    03/06/23 1457  Morphine injection 1 mg  Every 4 Hours PRN        See Hyperspace for full Linked Orders Report.    03/06/23 1457    03/06/23 1457  naloxone (NARCAN) injection 0.4 mg  Every 5 Minutes PRN        See Hyperspace for full Linked Orders Report.    03/06/23 1457    03/06/23 1457  ondansetron (ZOFRAN) tablet 4 mg  Every 6 Hours PRN        See Hyperspace for full Linked Orders Report.    03/06/23 1457    03/06/23 1457  ondansetron (ZOFRAN) injection 4 mg  Every 6 Hours PRN        See Hyperspace for full Linked Orders Report.    03/06/23 1457    03/06/23 1457  acetaminophen (TYLENOL) tablet 650 mg  Every 4 Hours PRN         03/06/23 1457    03/06/23 1444  ABG with Co-Ox and Electrolytes  Once         03/06/23 1443    03/06/23 1426  POC Activated Clotting Time  PROCEDURE ONCE         03/06/23 1332    03/06/23 1413  Hemoglobin & Hematocrit, Blood  STAT         03/06/23 1412    03/06/23 1400  iopamidol (ISOVUE-370) 76 % injection  Code / Trauma / Sedation Medication         03/06/23 1436    03/06/23 1354  ticagrelor (BRILINTA) tablet  Code / Trauma / Sedation Medication,   Status:  Discontinued         03/06/23 1358    03/06/23 1333  POC Activated Clotting Time  PROCEDURE ONCE         03/06/23 1326    03/06/23 1330  sodium chloride 0.9 %  infusion  Continuous         03/06/23 1244    03/06/23 1306  POC Activated Clotting Time  PROCEDURE ONCE         03/06/23 1212    03/06/23 1242  Vital Signs and Check distal extremity for warmth, color, sensation and pulses with each vital sign and site check.  Per Order Details        Comments: Check distal extremity for warmth, color, sensation and pulses with each vital sign and site check.    03/06/23 1244    03/06/23 1242  Cardiac Monitoring  Continuous         03/06/23 1244    03/06/23 1242  Obtain STAT EKG during chest pain. Notify MD of any change in rhythm, ST segments or complaints of chest pain.  Until Discontinued         03/06/23 1244    03/06/23 1242  Encourage fluids  Until Discontinued         03/06/23 1244    03/06/23 1242  Activity - Strict Bed Rest  Until Discontinued         03/06/23 1244    03/06/23 1242  Assess Puncture Site, Vital SIgns, Distal Pulses & Observe Site for Bleeding or Swelling  Per Order Details        Comments: Every 15 Minutes x4, Every 30 Minutes x4, & Every 1 Hour x2    03/06/23 1244    03/06/23 1242  Remove Femoral / Brachial Sheaths  Once         03/06/23 1244    03/06/23 1242  Apply Femostop  Per Order Details        Comments: For Groin Bleeding, Notify MD or PA If Applied    03/06/23 1244    03/06/23 1242  ECG 12 Lead  STAT         03/06/23 1244    03/06/23 1242  Notify MD if platelet count is less than 100,000, is less than 1/2 baseline, or if Hgb drops by more than 3mg/dl.  Until Discontinued         03/06/23 1244    03/06/23 1242  Notify MD of hypotension (SBP less than 95), bleeding, or dysrythmia and follow Sheath Removal Policy if needed.  Continuous         03/06/23 1244    03/06/23 1242  Cardiac Rehab Evaluation and Enrollment  Once        Provider:  (Not yet assigned)    03/06/23 1244    03/06/23 1242  Hold metFORMIN (GLUCOPHAGE) for 48 Hours  Continuous         03/06/23 1244    03/06/23 1242  Discontinue Radial TR Band Per Removal Protocol  Per Order Details         Comments: If Bleeding Occurs Re-Inflate 2 mL & Then Continue With 2 mL Every 15 Minute Deflations. Gently Roll Band Off Insertion Site After Completely Deflated.  Start releasing air from TR Band in 60 minutes.    03/06/23 1244    03/06/23 1235  POC Activated Clotting Time  PROCEDURE ONCE         03/06/23 1141    03/06/23 1213  POC Activated Clotting Time  PROCEDURE ONCE         03/06/23 1206    03/06/23 1155  POC Activated Clotting Time  PROCEDURE ONCE         03/06/23 1102    03/06/23 1142  POC Activated Clotting Time  PROCEDURE ONCE         03/06/23 1135    03/06/23 1130  rocuronium (ZEMURON) injection 100 mg  Once         03/06/23 1044    03/06/23 1124  POC Activated Clotting Time  PROCEDURE ONCE         03/06/23 1030    03/06/23 1103  POC Activated Clotting Time  PROCEDURE ONCE         03/06/23 1055    03/06/23 1101  POC Activated Clotting Time  PROCEDURE ONCE         03/06/23 1009    03/06/23 1101  niCARdipine (CARDENE) injection  Code / Trauma / Sedation Medication,   Status:  Discontinued         03/06/23 1101    03/06/23 1100  ceFAZolin in 0.9% normal saline (ANCEF) IVPB solution 2 g  Once         03/06/23 1005    03/06/23 1047  POC Activated Clotting Time  PROCEDURE ONCE         03/06/23 0954    03/06/23 1037  propofol (DIPRIVAN) infusion 10 mg/mL 100 mL  Code / Trauma / Sedation Continuous Med         03/06/23 1037    03/06/23 1031  POC Activated Clotting Time  PROCEDURE ONCE         03/06/23 1024    03/06/23 1010  POC Activated Clotting Time  PROCEDURE ONCE         03/06/23 1001    03/06/23 1006  ondansetron (ZOFRAN) injection  Code / Trauma / Sedation Medication,   Status:  Discontinued         03/06/23 1006    03/06/23 1005  midazolam (VERSED) injection  Code / Trauma / Sedation Medication,   Status:  Discontinued         03/06/23 1006    03/06/23 1002  fentaNYL citrate (PF) (SUBLIMAZE) injection  Code / Trauma / Sedation Medication,   Status:  Discontinued         03/06/23 1002    03/06/23 0955   POC Activated Clotting Time  PROCEDURE ONCE         03/06/23 0947    03/06/23 0954  cangrelor 50 mg in 250 ml (200 mcg/ml) IV infusion  Code / Trauma / Sedation Continuous Med         03/06/23 0955    03/06/23 0954  Cangrelor Tetrasodium (200 mcg/mL) (KENGREAL) bolus from bag  Code / Trauma / Sedation Medication,   Status:  Discontinued         03/06/23 0954    03/06/23 0928  furosemide (LASIX) injection  Code / Trauma / Sedation Medication,   Status:  Discontinued         03/06/23 0928    03/06/23 0922  heparin (porcine) injection  Code / Trauma / Sedation Medication,   Status:  Discontinued         03/06/23 0923    03/06/23 0919  Inpatient Admission  Once         03/06/23 0918    03/06/23 0918  verapamil (ISOPTIN) injection  Code / Trauma / Sedation Medication,   Status:  Discontinued         03/06/23 0918    03/06/23 0918  EP/CRM Study  Once         03/06/23 0917    03/06/23 0916  lidocaine (XYLOCAINE) 2% injection  Code / Trauma / Sedation Medication,   Status:  Discontinued         03/06/23 0916    03/06/23 0916  CBC & Differential  Once         03/06/23 0915    03/06/23 0916  Basic Metabolic Panel  Once         03/06/23 0915    03/06/23 0916  Blood Gas, Arterial -With Co-Ox Panel: Yes  Once         03/06/23 0915    03/06/23 0916  CBC Auto Differential  PROCEDURE ONCE         03/06/23 0915    03/06/23 0913  norepinephrine (LEVOPHED) 8 mg in 250 mL NS infusion (premix)  Code / Trauma / Sedation Continuous Med         03/06/23 0913    03/06/23 0910  Cardiac Catheterization/Vascular Study  Once         03/06/23 0909    Unscheduled  Change site dressing  As Needed       03/06/23 1244    Unscheduled  Head of Bed: Flat; Activity Level: Strict Bed Rest; Keep Affected Extremity/ Extremities Straight; Total Bedrest Time? Other; Length of Time: Continuous bedrest - patient has Impella and is intubated  As Needed       03/06/23 1244    Unscheduled  Oxygen Therapy- Nasal Cannula; 2 LPM; Titrate for SPO2: 90%  Continuous  PRN       23 1457                   Consult Notes (last 24 hours)      Mindy Triana MD at 23 1644          Pulmonary / Critical Care Consult Note      Patient Name: Vince Jain  : 1950  MRN: [id number]  Primary Care Physician:  Yady Schneider DO  Referring Physician: Sanchez Zaman MD  Date of admission: 3/6/2023    Subjective   Subjective     Reason for Consult/ Chief Complaint:   Cardiogenic shock, respiratory failure requiring mechanical ventilation    HPI:  Vince Jain is a 72 y.o. male with unclear past medical history presented to the ED with chest pain found to have anterior LAD STEMI status post PCI.  During catheterization patient was found to be HD unstable with bleeding at access site.  ICU team was called down to the Cath Lab and emergent intubation was placed for airway protection.  At the time, interventional cardiology team was placing an Impella for patient.  However the Impella device was ultimately not placed due to malpositioning versus hematoma around access site.  The procedure was aborted and patient was transferred to the ICU for further management. Of note, patient is also a Jehovah witness with no blood transfusions.  On arrival to the ICU patient is intubated on vent settings 24/450/70%FiO2/PEEP of 8. He is on propofol gtt. Norepinephrine gtt was also running at 0.6 mcg/kg/min. Bedside US showed weakened heart with EF<30% even with high dose levophed. ABG 7.26/44/297. No other acute issues at this time.       Review of Systems  Unable to obtain      Personal History     Past Medical History:   Diagnosis Date   • Cataract    • Glaucoma    • Head injury     x2 1st 15yrs ago and last 2yrs ago   • Hernia, inguinal     right   • Hyperlipidemia    • Hypertension    • Prostatitis    • Stroke (HCC)     unsure was told he had a little one       Past Surgical History:   Procedure Laterality Date   • CATARACT EXTRACTION Right    • HERNIA REPAIR Right    •  TONSILLECTOMY     • VEIN SURGERY Left        Family History: family history includes Cancer in his father; Stroke in his mother. Otherwise pertinent FHx was reviewed and not pertinent to current issue.    Social History:  reports that he has never smoked. He has never been exposed to tobacco smoke. He has never used smokeless tobacco. He reports that he does not currently use alcohol. He reports that he does not use drugs.    Home Medications:  furosemide, rosuvastatin, and tamsulosin    Allergies:  No Known Allergies    Objective    Objective     Vitals:   Temp:  [97.6 °F (36.4 °C)] 97.6 °F (36.4 °C)  Heart Rate:  [] 80  Resp:  [24] 24  BP: ()/(35-97) 98/77  Flow (L/min):  [6] 6  FiO2 (%):  [70 %-95 %] 70 %    Physical Exam:  Vital Signs Reviewed   General:   NAD. Lying in bed    HEENT:  PERRL, EOMI.  OP, nares clear  Neck:  Supple, no JVD, no thyromegaly  Chest:  good aeration, no work of breathing noted on mechanical ventilation  CV: RRR, no MGR, pulses 2+, equal.  Abd:  Soft, NT, ND, + BS, no HSM  EXT:  no clubbing, no cyanosis, no edema  Neuro:  CN grossly intact, no focal deficits. Sedated  Skin: No rashes or lesions noted      Result Review    Result Review:  I have personally reviewed the results from the time of this admission to 3/6/2023 16:44 EST and agree with these findings:  [x]  Laboratory  [x]  Microbiology  [x]  Radiology  []  EKG/Telemetry   []  Cardiology/Vascular   []  Pathology  []  Old records  []  Other:  Most notable findings include:   -ABG 7.26/44/297    Assessment & Plan   Assessment / Plan     Active Hospital Problems:  Active Hospital Problems    Diagnosis    • **STEMI involving left anterior descending coronary artery (HCC)    • Cardiogenic shock (HCC)    • S/P coronary artery stent placement          Impression:  Acute cardiogenic shock  STEMI status post PCI to LAD  Respiratory failure requiring mechanical ventilation  Metabolic acidosis   Lactic  acidosis  Hyperglycemia  Jehovah Witness patient    Plan:  -Keep patient intubated overnight; repeat ABG at 1900; adjust vent accordingly based on ABG  -Continue low-dose propofol to keep RASS -2; PRN Fent; Daily SAT/SBT; plan for extubation in the morning  -Levo currently at 0.6 mcg/kg/min; bedside ultrasound showed poor squeeze despite high levels of norepinephrine.  We will add dobutamine at 2.5 mcg/kg/min with goal of weaning down norepinephrine.  If further inotropic support is needed, will add epinephrine   -Patient has Silva, monitor urine output and renal function  -Continue aspirin, statin, Brilinta for PCI to LAD  -Avoid beta-blockade given shock   -Follow lactic acid  -Monitor femoral access site for hematoma  -Monitor hemoglobin  -No DVT ppx at this time  -NPO at this time    DVT prophylaxis:  No DVT prophylaxis order currently exists.     Code Status and Medical Interventions:   Ordered at: 03/06/23 0276     Level Of Support Discussed With:    Patient     Code Status (Patient has no pulse and is not breathing):    CPR (Attempt to Resuscitate)     Medical Interventions (Patient has pulse or is breathing):    Full Support     Release to patient:    Routine Release      The patient is critically ill in the ICU with respiratory failure requiring mechanical ventilation, acute cardiogenic shock requiring inotropic support. Multidisciplinary bedside critical care rounds were performed with nursing staff, respiratory therapy, pharmacy, nutritional services, social work. I have personally reviewed the chart, labs and any pertinent imaging available.  I have spent 37 minutes of critical care time, excluding procedures, in the care of this patient.    Electronically signed by Mindy Triana MD, 03/06/23, 4:44 PM EST.    Electronically signed by Mindy Triana MD at 03/06/23 3862

## 2023-03-07 NOTE — PLAN OF CARE
Goal Outcome Evaluation:      Patient was able to weaned off of sedation fairly easy throughout the night. Patient had a episode of vomiting and has a very sensitive gas reflex. Levophed was weaned down to 0.07. Dobutamine remains infusing at 5.     Patient spiked a 100.8 temperature, added a fan and temperature came down on its own.   Patient passed SBT and still remains on PS 10/5 35% Fio2.     Kidney function worsened overnight. Anticipate patient to be extubated today.     Continue with current POC.

## 2023-03-07 NOTE — TELEPHONE ENCOUNTER
Spoke with daughter and told her that we would need to reschedule his biopsy to at least 4/13/23. If he has cardiac clearance by then, we would proceed but if not we would need to postpone it again. She verbalized understanding.

## 2023-03-07 NOTE — SIGNIFICANT NOTE
03/07/23 1045   Coping/Psychosocial   Observed Emotional State calm   Verbalized Emotional State other (see comments)  (Tired and waek)   Trust Relationship/Rapport empathic listening provided   Family/Support Persons family;daughter   Involvement in Care no interaction with patient   Additional Documentation Spiritual Care (Group)   Spiritual Care   Use of Spiritual Resources non-Jainism use of spiritual care   Spiritual Care Source  initiative   Spiritual Care Follow-Up follow-up, none required as presently assessed   Response to Spiritual Care engaged in conversation;receptive of support;thanks expressed   Spiritual Care Interventions supportive conversation provided   Spiritual Care Visit Type follow-up   Receptivity to Spiritual Care visit welcomed

## 2023-03-07 NOTE — PROGRESS NOTES
[facility]     Progress Note    Patient Name: Vince Jain  : 1950  MRN: [id number]  Primary Care Physician:  Yady Schneider DO  Date of admission: 3/6/2023    Subjective   Subjective     HPI:  Mr. Jain was extubated this morning and is lethargic, but arouses to voice and responds appropriately.  He remains on inotropic therapy with dobutamine at 5 mcg/kg/min and also on low-dose Levophed.  Mr. Jain went into atrial fibrillation/flutter overnight, but is now back in sinus rhythm.     Review of Systems  Negative except as per HPI    Objective   Objective     Vitals:   Temp:  [97.5 °F (36.4 °C)-100.8 °F (38.2 °C)] 99.6 °F (37.6 °C)  Heart Rate:  [] 105  Resp:  [16-32] 18  BP: ()/() 89/65  Flow (L/min):  [1-3] 1  FiO2 (%):  [35 %-95 %] 35 %    Physical Exam  General: lethargic but arousable, no acute distress  Neck: supple, mild JVD, no carotid bruit, no masses  Chest: decreased air movement over lower fields with bibasilar crackles, no tachypnea, normal effort  CV: regular rate and rhythm with frequent ectopy, S1 and S2 present, +S3 gallop, mild systolic murmur at apex, no friction rub  Abdomen: soft, non-distended, non-tender, + bowel sounds, no masses  Extremities: no edema, no cyanosis or clubbing, palpable radial pulses, +ecchymosis but no significant hematoma at femoral access sites  Skin: cool and dry, no rashes or jaundice    Current Medications:   •  acetaminophen  •  aspirin  •  aspirin  •  DOBUTamine  •  furosemide  •  iopamidol  •  Morphine **AND** naloxone  •  norepinephrine  •  ondansetron **OR** ondansetron  •  rosuvastatin  •  ticagrelor     Result Review    Result Review:  I have personally reviewed the results from the time of this admission to 3/7/2023 09:51 EST and agree with these findings:  [x]  Laboratory  []  Microbiology  [x]  Radiology  [x]  EKG/Telemetry   [x]  Cardiology/Vascular   []  Pathology  []  Old records  []  Other:  Most notable  findings include: HS Troponin = >10,000, proBNP = 6603, lactic acid = 3.4 (decreased from 6.8 yesterday), HgbA1c = 7.60, Cr = 1.60, K = 5.3, Mg = 1.9, glucose = 199, Hgb = 10.7, platelets = 187    Echocardiogram pending      Assessment & Plan   Assessment / Plan     Brief Patient Summary:  Vince Jain is a 72 y.o. male with:  -Acute anterior/anterolateral STEMI, s/p emergent PCI to the culprit mid LAD  -Cardiogenic shock, s/p Impella CP LVAD placement and removal, currently continues to require vasopressor support with low dose norepinephrine and inotropic support with dobutamine  -Ischemic cardiomyopathy, severe (echocardiogram pending but EF was severely reduced on left ventriculogram on admission)  -Paroxysmal atrial fibrillation/flutter with RVR  -Acute respiratory failure requiring mechanical ventilation, resolved (patient extubated this morning)  -Acute cardiogenic pulmonary edema, improving with gentle diuresis  -Lactic acidosis, improving  -History of CVA     Active Hospital Problems:  Active Hospital Problems    Diagnosis    • **STEMI involving left anterior descending coronary artery (HCC)    • Cardiogenic shock (HCC)    • S/P coronary artery stent placement        Plan:   -Continue to wean vasopressor support with Levophed as tolerated.  Will plan to continue inotropic therapy with dobutamine for at least another 24 hours.  -Continue IV Lasix at current dosing and monitor electrolytes and renal function closely  -Will review echo images when available   -Continue to trend lactic acid, H/H, and Troponin levels   -Will start additional meds for severe LV dysfunction once he is off pressors and inotropes and more hemodynamically stable   -Continue DAPT with aspirin and ticagrelor for a minimum of one year  -Continue high-intensity statin  -Consider anticoagulation with a heparin drip if he has further episodes of atrial fib/flutter, as his [id number] score is elevated       DVT prophylaxis:  No DVT  prophylaxis order currently exists.    CODE STATUS:    Level Of Support Discussed With: Patient  Code Status (Patient has no pulse and is not breathing): CPR (Attempt to Resuscitate)  Medical Interventions (Patient has pulse or is breathing): Full Support  Release to patient: Routine Release    Electronically signed by Sanchez Zaman MD, 03/07/23, 9:51 AM EST.

## 2023-03-07 NOTE — PLAN OF CARE
Goal Outcome Evaluation:  Plan of Care Reviewed With: patient        Progress: no change  Outcome Evaluation: Pt. tolerating PS well 10/5.

## 2023-03-07 NOTE — PROGRESS NOTES
Pulmonary / Critical Care progress Note      Patient Name: Vince Jain  : 1950  MRN: [id number]  Primary Care Physician:  Yady Schneider DO  Referring Physician: Sanchez Zaman MD  Date of admission: 3/6/2023    Subjective   Subjective     Reason for Consult/ Chief Complaint:   Cardiogenic shock, respiratory failure requiring mechanical ventilation    Over the past 24 hours  Patient taken to Cath Lab 2/2 STEMI LAD, PCI placed, cardiogenic shock temporarily stabilized with Impella, intubated.     This morning patient on mechanical ventilator, tolerating pressure 14/5, awake off sedation.  Remains on Levophed and dobutamine    Review of Systems  Unable to obtain patient is on mechanical vent    Personal History     Past Medical History:   Diagnosis Date   • Cataract    • Glaucoma    • Head injury     x2 1st 15yrs ago and last 2yrs ago   • Hernia, inguinal     right   • Hyperlipidemia    • Hypertension    • Prostatitis    • Stroke (HCC)     unsure was told he had a little one       Past Surgical History:   Procedure Laterality Date   • CATARACT EXTRACTION Right    • HERNIA REPAIR Right    • TONSILLECTOMY     • VEIN SURGERY Left        Family History: family history includes Cancer in his father; Stroke in his mother. Otherwise pertinent FHx was reviewed and not pertinent to current issue.    Social History:  reports that he has never smoked. He has never been exposed to tobacco smoke. He has never used smokeless tobacco. He reports that he does not currently use alcohol. He reports that he does not use drugs.    Home Medications:  furosemide, rosuvastatin, and tamsulosin    Allergies:  No Known Allergies    Objective    Objective     Vitals:   Temp:  [97.5 °F (36.4 °C)-100.8 °F (38.2 °C)] 99.8 °F (37.7 °C)  Heart Rate:  [] 96  Resp:  [13-32] 19  BP: ()/() 96/73  Flow (L/min):  [1-3] 1  FiO2 (%):  [35 %-95 %] 35 %    Physical Exam:  Vital Signs Reviewed   General:  Alert, NAD. Lying  in bed.     HEENT:  PERRL, EOMI.    Neck:  No JVD, no thyromegaly  Lymph: no axillary, cervical, supraclavicular lymphadenopathy noted bilaterally  Chest:  Clear to auscultation bilaterally, no work of breathing noted on 1L NC  CV: RRR, no M/G/R, pulses 2+  Abd:  Soft, NT, ND, +BS  EXT:  no clubbing, no cyanosis, no edema  Neuro:  A&Ox3, CN grossly intact, no focal deficits.  Skin: No rashes or lesions noted    Result Review    Result Review:  I have personally reviewed the results from the time of this admission to 3/7/2023 12:52 EST and agree with these findings:  [x]  Laboratory  [x]  Microbiology  [x]  Radiology  []  EKG/Telemetry   []  Cardiology/Vascular   []  Pathology  []  Old records  []  Other:  Most notable findings include:       Lab 03/07/23  1159 03/07/23  0240 03/06/23  1500 03/06/23  1412 03/06/23  0927   WBC  --  17.23*  --   --  4.21   HEMOGLOBIN  --  10.7*  --  12.5* 12.0*   HEMATOCRIT  --  33.1*  --  39.7 36.7*   PLATELETS  --  187  --   --  191   SODIUM 139 140  --   --  137   SODIUM, ARTERIAL  --   --  136.6  --   --    POTASSIUM 5.3* 5.3*  --   --  3.7   CHLORIDE 109* 110*  --   --  105   CO2 16.4* 18.0*  --   --  20.3*   BUN 30* 26*  --   --  18   CREATININE 1.98* 1.60*  --   --  1.11   GLUCOSE 201* 199*  --   --  254*   GLUCOSE, ARTERIAL  --   --  309*  --   --    CALCIUM 7.5* 7.4*  --   --  7.9*   PHOSPHORUS 4.3  --   --   --   --    TOTAL PROTEIN 5.1*  --   --   --   --    ALBUMIN 3.0*  --   --   --   --    GLOBULIN 2.1  --   --   --   --          Assessment & Plan   Assessment / Plan     Active Hospital Problems:  Active Hospital Problems    Diagnosis    • **STEMI involving left anterior descending coronary artery (HCC)    • Cardiogenic shock (HCC)    • S/P coronary artery stent placement      Impression:  Acute cardiogenic shock  STEMI status post PCI to LAD  Respiratory failure requiring mechanical ventilation  Metabolic acidosis   Lactic acidosis  Hyperglycemia  [employer] Witness  patient    Plan:  -ext to NC yesterday; continue to wean as tolerated to keep SpO2 >90%  -Currently on dobutamine 2.5 mcg/kg/min, will increase to 5; monitor for arrhythmias  -Continue Levophed at 0.04; goal is to wean this off as tolerated  -May need more inotropic support; will add milnirone and discuss with primary to see if patient is a candidate for LVAD at  or   -Continue Silva catheter, monitor urine output I/O closely  -Check proBNP, continue aggressive diuresis as tolerated with Lasix 40 mg IV twice daily  -Recheck renal panel this afternoon, trend lactic acid  -Continue aspirin, statin, Brilinta for PCI to LAD  -Avoid beta-blockade given shock   -Monitor femoral access site for hematoma  -Monitor hemoglobin  -No DVT ppx at this time  -Okay for speech to evaluate patient can advance diet would do heart healthy/low potassium    DVT prophylaxis:  No DVT prophylaxis order currently exists.     Code Status and Medical Interventions:   Ordered at: 03/06/23 7684     Level Of Support Discussed With:    Patient     Code Status (Patient has no pulse and is not breathing):    CPR (Attempt to Resuscitate)     Medical Interventions (Patient has pulse or is breathing):    Full Support     Release to patient:    Routine Release      Patient is critically ill with acute cardiogenic shock requiring multiple inotropic support. 35 minutes of critical care time was spent managing this patient, excluding procedures. Of this time, I, Dr. Mindy Triana, spent 25 minutes and Shantell Pinedo, nurse practitioner, spent 10 minutes in accordance with split shared billing. This included personally reviewing all pertinent labs, imaging, microbiology and documentation. Also discussing the case with the patient and any available family, the admitting physician and any available ancillary staff.     Electronically signed by Mindy Triana MD, 03/07/23, 2:13 PM EST.

## 2023-03-07 NOTE — TELEPHONE ENCOUNTER
Pt is scheduled for surgery with Dr Hernandez on Thursday 03/09. He is currently inpatient at [facility] for a heart attack. Daughter called and you can call her to discuss anything.

## 2023-03-07 NOTE — PLAN OF CARE
Goal Outcome Evaluation:  Plan of Care Reviewed With: patient        Progress: improving  Outcome Evaluation: PT placed on PS 10/5. Tolerating well but is anxious.

## 2023-03-07 NOTE — TELEPHONE ENCOUNTER
Yes, just call her and let her know we will need to move it until at least April, possibly longer depending upon cardiac clearance.  Thanks.

## 2023-03-08 NOTE — PROGRESS NOTES
[facility]     Progress Note    Patient Name: Vince Jain  : 1950  MRN: [id number]  Primary Care Physician:  Yady Schneider DO  Date of admission: 3/6/2023    Subjective   Subjective     HPI:  Mr. Jain is resting comfortably in a bedside chair and does not report any chest pain/pressure, nausea, lightheadedness, or acute dyspnea.  He remains very sleepy today, but answers questions appropriately.  His telemetry monitor shows atrial flutter with a heart rate currently in the 90s.  He has been off Levophed since late yesterday afternoon, but remains on low dose dobutamine (2.5 mcg/kg/min) and milrinone.  Mr. Jain remains on supplemental O2 by nasal cannula at 2 L/minute.    Review of Systems  Negative except as per HPI    Objective   Objective     Vitals:   Temp:  [98 °F (36.7 °C)-99.8 °F (37.7 °C)] 98 °F (36.7 °C)  Heart Rate:  [] 112  Resp:  [10-21] 19  BP: ()/(49-82) 106/78  Flow (L/min):  [1] 1    Physical Exam  General: alert and oriented x 2, no acute distress  Neck: supple, mild JVD, no carotid bruit, no masses  Chest: equal air entry bilaterally, mildly decreased air movement in bases bilaterally, no tachypnea, normal effort with no increased work of breathing  CV: regular rate and rhythm, S1 and S2 present, +S3 gallop, 3/6 systolic murmur at apex  Abdomen: soft, non-tender, non-distended, normal bowel sounds, no hepatomegaly or masses  Extremities: no cyanosis, no significant lower extremity edema, 2+ radial pulses bilaterally  Neuro: normal speech, no focal motor deficits     Current Medications:   •  acetaminophen  •  aspirin  •  DOBUTamine  •  furosemide  •  heparin (porcine)  •  iopamidol  •  milrinone  •  Morphine **AND** naloxone  •  norepinephrine  •  ondansetron **OR** ondansetron  •  rosuvastatin  •  ticagrelor     Result Review    Result Review:  I have personally reviewed the results from the time of this admission to 3/8/2023 09:13 EST and agree with  these findings:  [x]  Laboratory  []  Microbiology  [x]  Radiology  [x]  EKG/Telemetry   [x]  Cardiology/Vascular   []  Pathology  []  Old records  []  Other:  Most notable findings include: lactic acid = 2.1, Cr = 2.02 (increased from 1.98), Na = 138, K = 4.3, Mg = 1.9, glucose = 213, ALT = 80 (decreased from 94), AST = 388 (decreased from 519), Hgb = 9.2, platelets = 145     Echocardiogram 3/7/23:  Moderately reduced left ventricular systolic function with an estimated ejection fraction of 35%.  Mid-distal anterior/anteroseptal/anterolateral and apical akinesis, consistent with LAD-territory infarct.  Diastolic function could not be fully assessed secondary to atrial flutter, but the tissue Doppler findings were consistent with elevated left atrial pressure.  Moderate to severe concentric left ventricular hypertrophy.  Severely dilated left atrium.  Mildly dilated right atrium.  Thickened mitral leaflets with moderate to severe mitral regurgitation.  Consider JONATHON for further evaluation if clinically warranted.  Mild to moderate tricuspid regurgitation.  Estimated right ventricular systolic pressure was severely elevated at 61 mmHg.  No evidence of pericardial effusion.      Assessment & Plan   Assessment / Plan     Brief Patient Summary:  Vince Jain is a 72 y.o. male with:  -Acute anterior/anterolateral STEMI, s/p emergent PCI to the culprit mid LAD  -Cardiogenic shock, s/p Impella CP LVAD placement and removal, improving - patient is now off vasopressors, but remains on inotropic support with dobutamine and milrinone  -Ischemic cardiomyopathy, EF=35% per yesterday's echo  -Moderate-severe mitral regurgitation  -Paroxysmal atrial fibrillation/flutter with RVR  -Acute respiratory failure requiring mechanical ventilation, resolved (patient extubated yesterday)   -Acute cardiogenic pulmonary edema, improved with gentle diuresis  -Transaminitis, likely due to hepatic congestion from CHF and/or hypotension/shock  on admission, improving   -Lactic acidosis, improving  -History of CVA     Active Hospital Problems:  Active Hospital Problems    Diagnosis    • **STEMI involving left anterior descending coronary artery (HCC)    • Cardiogenic shock (HCC)    • S/P coronary artery stent placement        Plan:   -Continue inotropic support with low dose dobutamine (2.5 mcg/kg/min) and milrinone for now, but will attempt to wean inotropes as tolerated over the next 24-48 hours.  Mr. Jain remains off vasopressors at this point.  -Continue supplemental O2 to maintain oxygen saturation >90%  -Gentle diuresis with IV Lasix; recommend increasing dosing schedule to 40 mg TID today  -Continue DAPT for 10-14 days.  After that time, I will plan to discontinue aspirin and continue antiplatelet monotherapy with ticagrelor, due to the patient's concomitant atrial flutter (with elevated [id number] score) and need for chronic anticoagulation.  -Add heparin at DVT prophylaxis dosing for now and monitor H/H closely  -Follow LFTs closely, but will continue high-intensity statin therapy for now  -Advance meds for LV dysfunction once off inotropes      DVT prophylaxis:  Medical DVT prophylaxis orders are present.    CODE STATUS:    Level Of Support Discussed With: Patient  Code Status (Patient has no pulse and is not breathing): CPR (Attempt to Resuscitate)  Medical Interventions (Patient has pulse or is breathing): Full Support  Release to patient: Routine Release    Electronically signed by Sanchez Zaman MD, 03/08/23, 9:13 AM EST.

## 2023-03-08 NOTE — PROGRESS NOTES
Pulmonary / Critical Care progress Note      Patient Name: Vince Jain  : 1950  MRN: [id number]  Primary Care Physician:  Yady Schneider DO  Referring Physician: Sanchez Zaman MD  Date of admission: 3/6/2023    Subjective   Subjective     Reason for Consult/ Chief Complaint:   Cardiogenic shock, respiratory failure requiring mechanical ventilation    Interim summary  Patient taken to Cath Lab 2/ STEMI LAD, PCI placed, cardiogenic shock temporarily stabilized with Impella, intubated.       Over the past 24 hours  Extubated to nasal cannula  Continues on dobutamine and on milrinone  Weaned off Levophed    Today   Sitting up in chair   Remains on milrinone at 0.25 and dobutamine at 2.5  On 2 LNC  Slight upward trend and neck creatinine to 2.02  Lactate downtrending at 2.1  1 L of urine output documented overnight      Review of Systems  General: Fatigue, weakness; otherwise denied complaints  HEENT: Denied complaints  Respiratory: Shortness of breath, LARSON, nonproductive cough; otherwise denied complaints  Cardiovascular: LARSON; otherwise denied complaints  GI: Denied complaints  Neurologic: Denied complaints  Skin: Denied complaints    Personal History     Past Medical History:   Diagnosis Date   • Cataract    • Glaucoma    • Head injury     x2 1st 15yrs ago and last 2yrs ago   • Hernia, inguinal     right   • Hyperlipidemia    • Hypertension    • Prostatitis    • Stroke (HCC)     unsure was told he had a little one       Past Surgical History:   Procedure Laterality Date   • CATARACT EXTRACTION Right    • HERNIA REPAIR Right    • TONSILLECTOMY     • VEIN SURGERY Left        Family History: family history includes Cancer in his father; Stroke in his mother. Otherwise pertinent FHx was reviewed and not pertinent to current issue.    Social History:  reports that he has never smoked. He has never been exposed to tobacco smoke. He has never used smokeless tobacco. He reports that he does not currently  use alcohol. He reports that he does not use drugs.    Home Medications:  furosemide, rosuvastatin, and tamsulosin    Allergies:  No Known Allergies    Objective    Objective     Vitals:   Temp:  [98 °F (36.7 °C)-99.8 °F (37.7 °C)] 98 °F (36.7 °C)  Heart Rate:  [] 80  Resp:  [10-21] 14  BP: ()/(49-82) 83/62  Flow (L/min):  [1] 1    Physical Exam:  Vital Signs Reviewed   General:  Alert, NAD.  Sitting up in chair   HEENT:  PERRL, EOMI.    Neck:  No JVD, no thyromegaly  Lymph: no axillary, cervical, supraclavicular lymphadenopathy noted bilaterally  Chest:  Clear to auscultation bilaterally, no work of breathing noted on room air  CV: RRR, no M/G/R, pulses 2+  Abd:  Soft, NT, ND, +BS  EXT:  no clubbing, no cyanosis, no edema  Neuro:  A&Ox3, CN grossly intact, no focal deficits.  Skin: No rashes or lesions noted    Result Review    Result Review:  I have personally reviewed the results from the time of this admission to 3/8/2023 10:12 EST and agree with these findings:  [x]  Laboratory  [x]  Microbiology  [x]  Radiology  []  EKG/Telemetry   []  Cardiology/Vascular   []  Pathology  []  Old records  []  Other:  Most notable findings include:       Lab 03/08/23  0311 03/07/23  1159 03/07/23  0240 03/06/23  1500 03/06/23  1412 03/06/23  0927   WBC 17.58*  --  17.23*  --   --  4.21   HEMOGLOBIN 9.2*  --  10.7*  --  12.5* 12.0*   HEMATOCRIT 28.4*  --  33.1*  --  39.7 36.7*   PLATELETS 145  --  187  --   --  191   SODIUM 138 139 140  --   --  137   SODIUM, ARTERIAL  --   --   --  136.6  --   --    POTASSIUM 4.3 5.3* 5.3*  --   --  3.7   CHLORIDE 106 109* 110*  --   --  105   CO2 20.7* 16.4* 18.0*  --   --  20.3*   BUN 38* 30* 26*  --   --  18   CREATININE 2.02* 1.98* 1.60*  --   --  1.11   GLUCOSE 213* 201* 199*  --   --  254*   GLUCOSE, ARTERIAL  --   --   --  309*  --   --    CALCIUM 7.8* 7.5* 7.4*  --   --  7.9*   PHOSPHORUS 3.5 4.3  --   --   --   --    TOTAL PROTEIN 5.1* 5.1*  --   --   --   --    ALBUMIN  3.1* 3.0*  --   --   --   --    GLOBULIN 2.0 2.1  --   --   --   --          Assessment & Plan   Assessment / Plan     Active Hospital Problems:  Active Hospital Problems    Diagnosis    • **STEMI involving left anterior descending coronary artery (HCC)    • Cardiogenic shock (HCC)    • S/P coronary artery stent placement      Impression:  Acute cardiogenic shock  STEMI status post PCI to LAD  Respiratory failure requiring mechanical ventilation  Metabolic acidosis   Lactic acidosis  Hyperglycemia  [employer] Witness patient    Plan:  -Obtain chest x-ray  -Midline consult in place.  Patient has pulled multiple IVs, requiring frequent lab draws.    -Continues on supplemental O2.  Continue to wean as tolerated to keep SpO2 >90%  -Currently on dobutamine 2.5 mcg/kg/min, MAP range 55-65 currently.  May need to increase to 5; given worsening creatinine and decrease in UOP.  Monitor for for arrhythmias  -Levophed has been weaned off.  -Added milrinone on 3/7.   -Continue Silva catheter, monitor urine output I/O closely  -Monitor proBNP, continue aggressive diuresis as tolerated with Lasix 40 mg IV twice daily  -Recheck renal panel this afternoon, check lactic acid every 4 hours for now  -Continue aspirin, statin, Brilinta for PCI to LAD  -Add heparin for DVT prophylaxis  -Avoid beta-blockade given shock   -Monitor femoral access site for hematoma; Monitor hemoglobin  -Continue cardiac diet with low sodium  -Start basal insulin at 10 units nightly and continue SSI    DVT prophylaxis:  Medical DVT prophylaxis orders are present.     Code Status and Medical Interventions:   Ordered at: 03/06/23 2133     Level Of Support Discussed With:    Patient     Code Status (Patient has no pulse and is not breathing):    CPR (Attempt to Resuscitate)     Medical Interventions (Patient has pulse or is breathing):    Full Support     Release to patient:    Routine Release     Patient is critically ill with acute cardiogenic shock requiring  multiple inotropic support. 37 minutes of critical care time was spent managing this patient, excluding procedures. Of this time, I, Dr. Mindy Triana, spent 27 minutes and CRUZITO Keyes spent 10 minutes in accordance with split shared billing. This included personally reviewing all pertinent labs, imaging, microbiology and documentation. Also discussing the case with the patient and any available family, the admitting physician and any available ancillary staff.     Electronically signed by Mindy Triana MD, 03/08/23, 11:40 AM EST.

## 2023-03-08 NOTE — PLAN OF CARE
Goal Outcome Evaluation:   Pt A/OX4, A fib w/ BBB on monitor, remains on dobutamine gtt @ 5 and milrinone @ 0.25. MAP ranging between low 60s to mid 70s. No c/o pain. Purewick intact. Pt asking about when he will be discharged. Informed pt that he is still on continuous IV medications to help his heart, but the providers could give him a better answer this AM. Safety maintained, NAOE.

## 2023-03-08 NOTE — CONSULTS
Cardiac Rehab Phase I - Occurrence #1    Education Topics:  Cardiac Rehab yes     Topic Components:  Exercise yes     Teaching Aids:  Phase 2 Brochure yes     Teaching Method:  Written Instruction yes     Teaching Recipient:  Patient yes       Recovery/Discharge Instructions:  Cardiac Rehab Phase 2 yes       Patient Qualification:  Cardiac Rehab Phase 2 yes

## 2023-03-09 NOTE — PROCEDURES
Procedure Note: Central Line Placement Procedure Note    Indication: Shock    Consent obtained: Self    Time Out: performed at bedside    Procedure: The patient was positioned appropriately and the skin over the right internal jugular vein was prepped with ChloraPrep and draped in sterile fashion.  Local anesthesia over the insertion site was applied with 1% lidocaine.  A large bore needle was then advanced with ultrasound guidance until there was return of dark blood. Guidewire was inserted and site confirmed with ultrasound again. Site was dilated with dilator, and triple lumen catheter was then inserted into the vessel over the guidewire using the Seldinger technique.  All three ports showed good, free flowing blood return and easily flushed with saline.  The catheter was then securely fastened to the skin with sutures and covered with a sterile dressing.      A post-procedure x-ray is pending at this time.    The patient tolerated the procedure well.    Complications: None.    Electronically signed by Mindy Triana MD, 3/9/2023, 12:05 EST.

## 2023-03-09 NOTE — PROCEDURES
Arterial Line Placement    Indication: Hemodynamic monitoring, frequent ABGs    A time-out was completed verifying correct patient, procedure, site, positioning, and special equipment if applicable. Tyler’s test was performed to ensure adequate perfusion. The patient’s right wrist was prepped and draped in sterile fashion. 1% Lidocaine was used to anesthetize the area. A 18G Arrow arterial line was introduced into the right radial artery. The catheter was threaded over the guide wire and the needle was removed with appropriate pulsatile blood return. The catheter was then sutured in place to the skin and a sterile dressing applied. Perfusion to the extremity distal to the point of catheter insertion was checked and found to be adequate.    Estimated Blood Loss: <5 mL    The patient tolerated the procedure well and there were no complications.    Electronically signed by Mindy Triana MD, 3/9/2023, 12:04 EST.

## 2023-03-09 NOTE — CONSULTS
"Nutrition Services    Patient Name: Vince Jain  YOB: 1950  MRN: [id number]  Admission date: 3/6/2023      CLINICAL NUTRITION ASSESSMENT      Reason for Assessment  meal supplement request     H&P:    Past Medical History:   Diagnosis Date   • Cataract    • Glaucoma    • Head injury     x2 1st 15yrs ago and last 2yrs ago   • Hernia, inguinal     right   • Hyperlipidemia    • Hypertension    • Prostatitis    • Stroke (HCC)     unsure was told he had a little one        Current Problems:   Active Hospital Problems    Diagnosis    • **STEMI involving left anterior descending coronary artery (HCC)    • Cardiogenic shock (HCC)    • S/P coronary artery stent placement         Nutrition/Diet History         Narrative     Poor intake of meals so far: 25%. Labs, meds, skin notes reviewed. Elevated glucose over 200, receives insulin as needed.  Wt history: 2# increase in 2 months. Visited patient, family also in room. Pt about to eat his lunch meal. Pt agreeable to meal supplements: likes vanilla flavors.        Anthropometrics        Current Height, Weight Height: 165.1 cm (65\")  Weight: 95.6 kg (210 lb 12.2 oz)   Current BMI Body mass index is 35.07 kg/m².       Weight Hx  Wt Readings from Last 30 Encounters:   03/06/23 1500 95.6 kg (210 lb 12.2 oz)   02/07/23 1412 96 kg (211 lb 11.2 oz)   01/06/23 0907 94.3 kg (208 lb)   12/19/22 0933 89.8 kg (198 lb)   12/05/22 0935 90.4 kg (199 lb 6.4 oz)            Wt Change Observation 2# increase in  2 months.     Estimated/Assessed Needs       Energy Requirements 30 kcal/kg IBW ( 61.5)     EST Needs (kcal/day) 9830-0788        Protein Requirements  1 g/kg   EST Daily Needs (g/day) 96       Fluid Requirements 1 ml/kcal    Estimated Needs (mL/day) 8437-1923     Labs/Medications         Pertinent Labs Reviewed.   Results from last 7 days   Lab Units 03/08/23  2340 03/08/23  1528 03/08/23  0311   SODIUM mmol/L 136 135* 138   POTASSIUM mmol/L 3.9 4.3 4.3   CHLORIDE " mmol/L 102 101 106   CO2 mmol/L 21.7* 20.6* 20.7*   BUN mg/dL 46* 45* 38*   CREATININE mg/dL 2.15* 2.03* 2.02*   CALCIUM mg/dL 8.0* 7.9* 7.8*   BILIRUBIN mg/dL 0.6 0.5 0.4   ALK PHOS U/L 102 92 78   ALT (SGPT) U/L 70* 78* 80*   AST (SGOT) U/L 280* 317* 388*   GLUCOSE mg/dL 221* 273* 213*     Results from last 7 days   Lab Units 03/08/23  2340 03/08/23  0311 03/07/23  1159 03/07/23  0240 03/07/23  0240   MAGNESIUM mg/dL 2.1 1.9 2.0  --  1.9   PHOSPHORUS mg/dL 2.9 3.5 4.3   < >  --    HEMOGLOBIN g/dL 8.5* 9.2*  --   --  10.7*   HEMATOCRIT % 25.9* 28.4*  --   --  33.1*   TRIGLYCERIDES mg/dL  --   --   --   --  99    < > = values in this interval not displayed.     No results found for: COVID19  Lab Results   Component Value Date    HGBA1C 7.60 (H) 03/07/2023         Pertinent Medications Reviewed.     Current Nutrition Orders & Evaluation of Intake       Oral Nutrition     Current PO Diet Diet: Cardiac Diets; Healthy Heart (2-3 Na+) (low potassium); Texture: Regular Texture (IDDSI 7); Fluid Consistency: Thin (IDDSI 0)   Supplement Orders Placed This Encounter      Dietary Nutrition Supplements Boost Glucose Control (Glucerna Shake); vanilla      Dietary Nutrition Supplements Novasource Renal (Nepro); vanilla       Malnutrition Severity Assessment                Nutrition Diagnosis         Nutrition Dx Problem 1 Inadequate oral Intake related to decreased ability to consume sufficient energy as evidenced by decreased appetite. and 25% intake of meals       Nutrition Intervention         Send Boost Glucose Control  (vanilla) one daily  Send Novasource Renal (vanilla) meal supplement one daily     Medical Nutrition Therapy/Nutrition Education          Learner     Readiness Patient  Acceptance     Method     Response Explanation  Verbalizes understanding     Monitor/Evaluation        Monitor PO intake, Supplement intake, Pertinent labs, Weight, Skin status       Nutrition Discharge Plan         To be determined        Electronically signed by:  Mona Ramsey RD  03/09/23 12:34 EST

## 2023-03-09 NOTE — PROGRESS NOTES
Pulmonary / Critical Care progress Note      Patient Name: Vince Jain  : 1950  MRN: [id number]  Primary Care Physician:  Yady Schneider DO  Referring Physician: Sanchez Zaman MD  Date of admission: 3/6/2023    Subjective   Subjective     Reason for Consult/ Chief Complaint:   Cardiogenic shock, respiratory failure requiring mechanical ventilation    Interim summary  Patient taken to Cath Lab  STEMI LAD, PCI placed, cardiogenic shock temporarily stabilized with Impella, intubated.       Over the past 24 hours  Sitting up in chair   Remains on milrinone at 0.25 and dobutamine at 2.5  On 2 LNC  Slight upward trend and neck creatinine to 2.02  Lactate downtrending at 2.1  1 L of urine output documented overnight  With decreased appetite given shortness of breath      Today   In bed with head of bed elevated  Continues on 2 LNC  Patient with decrease p.o. intake due to feeling of suffocating while eating, and increased work of breathing due to shortness of breath  Family at bedside  Urinary output 1.65 L over the past 24 hours    Review of Systems  General: Fatigue, weakness; otherwise denied complaints  HEENT: Denied complaints  Respiratory: Shortness of breath, LARSON, nonproductive cough; otherwise denied complaints  Cardiovascular: LARSON; otherwise denied complaints  GI: Denied complaints  Neurologic: Denied complaints  Skin: Denied complaints    Personal History     Past Medical History:   Diagnosis Date   • Cataract    • Glaucoma    • Head injury     x2 1st 15yrs ago and last 2yrs ago   • Hernia, inguinal     right   • Hyperlipidemia    • Hypertension    • Prostatitis    • Stroke (HCC)     unsure was told he had a little one       Past Surgical History:   Procedure Laterality Date   • CATARACT EXTRACTION Right    • HERNIA REPAIR Right    • TONSILLECTOMY     • VEIN SURGERY Left        Family History: family history includes Cancer in his father; Stroke in his mother. Otherwise pertinent FHx was  reviewed and not pertinent to current issue.    Social History:  reports that he has never smoked. He has never been exposed to tobacco smoke. He has never used smokeless tobacco. He reports that he does not currently use alcohol. He reports that he does not use drugs.    Home Medications:  furosemide, rosuvastatin, and tamsulosin    Allergies:  No Known Allergies    Objective    Objective     Vitals:   Temp:  [98.7 °F (37.1 °C)-99.3 °F (37.4 °C)] 99 °F (37.2 °C)  Heart Rate:  [] 96  Resp:  [11-26] 11  BP: ()/(51-96) 106/79  Flow (L/min):  [2] 2    Physical Exam:  Vital Signs Reviewed   General:  Alert, NAD.  Sitting up in bed   HEENT:  PERRL, EOMI.    Neck:  No JVD, no thyromegaly  Lymph: no axillary, cervical, supraclavicular lymphadenopathy noted bilaterally  Chest:  Clear to auscultation bilaterally, no work of breathing noted on 2 L nasal cannula  CV: RRR, no M/G/R, pulses 2+  Abd:  Soft, NT, ND, +BS  EXT:  no clubbing, no cyanosis, no edema  Neuro:  A&Ox3, CN grossly intact, no focal deficits.  Skin: No rashes or lesions noted    Result Review    Result Review:  I have personally reviewed the results from the time of this admission to 3/9/2023 12:24 EST and agree with these findings:  [x]  Laboratory  [x]  Microbiology  [x]  Radiology  []  EKG/Telemetry   []  Cardiology/Vascular   []  Pathology  []  Old records  []  Other:  Most notable findings include:       Lab 03/08/23  2340 03/08/23  1528 03/08/23  0311 03/07/23  1159 03/07/23  0240 03/06/23  1500 03/06/23  1412 03/06/23  0927   WBC 15.37*  --  17.58*  --  17.23*  --   --  4.21   HEMOGLOBIN 8.5*  --  9.2*  --  10.7*  --  12.5* 12.0*   HEMATOCRIT 25.9*  --  28.4*  --  33.1*  --  39.7 36.7*   PLATELETS 141  --  145  --  187  --   --  191   SODIUM 136 135* 138 139 140  --   --  137   SODIUM, ARTERIAL  --   --   --   --   --  136.6  --   --    POTASSIUM 3.9 4.3 4.3 5.3* 5.3*  --   --  3.7   CHLORIDE 102 101 106 109* 110*  --   --  105   CO2 21.7*  20.6* 20.7* 16.4* 18.0*  --   --  20.3*   BUN 46* 45* 38* 30* 26*  --   --  18   CREATININE 2.15* 2.03* 2.02* 1.98* 1.60*  --   --  1.11   GLUCOSE 221* 273* 213* 201* 199*  --   --  254*   GLUCOSE, ARTERIAL  --   --   --   --   --  309*  --   --    CALCIUM 8.0* 7.9* 7.8* 7.5* 7.4*  --   --  7.9*   PHOSPHORUS 2.9  --  3.5 4.3  --   --   --   --    TOTAL PROTEIN 5.8* 5.7* 5.1* 5.1*  --   --   --   --    ALBUMIN 3.4* 3.5 3.1* 3.0*  --   --   --   --    GLOBULIN 2.4 2.2 2.0 2.1  --   --   --   --          Assessment & Plan   Assessment / Plan     Active Hospital Problems:  Active Hospital Problems    Diagnosis    • **STEMI involving left anterior descending coronary artery (HCC)    • Cardiogenic shock (HCC)    • S/P coronary artery stent placement      Impression:  Acute cardiogenic shock  STEMI status post PCI to LAD  Respiratory failure requiring mechanical ventilation  Metabolic acidosis   Lactic acidosis  Hyperglycemia  [employer] patient    Plan:  -Added morphine 1 mg as needed every 2 hours for air hunger  -Central line placed.  Please see procedure note for further detail  -Stat chest xray post procedure.  -Arterial line placed.  Please see procedure note for further detail.  Connect to [employer] to monitor cardiac index  -Continues on supplemental O2.  Continue to wean as tolerated to keep SpO2 >90%  -Continue dobutamine 5mcg/kg/min. Monitor for for arrhythmias  -Continue milrinone at 0.25 mcg/KG/min  -Levophed has been weaned off.  -Cardiology on board.  Discussing transfer to [facility] for evaluation for LVAD considerations given cardiogenic shock with ischemic cardiomyopathy requiring inotropic support with dobutamine and milrinone; patient also has requested patient to be transferred to a HF center.  -Continue Silva catheter, monitor urine output I/O closely  -Monitor proBNP, continue aggressive diuresis as tolerated.     -Increase Lasix to 60 mg every 8 hours.  Metolazone 5 mg p.o. x1  -Recheck renal panel  this afternoon, Lactic cleared 3/8.  -Continue aspirin, statin, Brilinta for PCI to LAD  -Heparin for DVT prophylaxis  -Avoid beta-blockade given shock   -Monitor femoral access site for hematoma; Monitor hemoglobin. Patient Jehovah Witness  -Continue cardiac diet with low sodium  -Increase basal insulin to 13 units twice daily and continue SSI to optimize glucose control  -On cardiac diet.  Patient however is with poor p.o. intake.  Dietitian consult in place for supplements with meals and as needed    DVT prophylaxis:  Medical DVT prophylaxis orders are present.     Code Status and Medical Interventions:   Ordered at: 03/06/23 4368     Level Of Support Discussed With:    Patient     Code Status (Patient has no pulse and is not breathing):    CPR (Attempt to Resuscitate)     Medical Interventions (Patient has pulse or is breathing):    Full Support     Release to patient:    Routine Release     Patient is critically ill with acute cardiogenic shock requiring multiple inotropic support. 35 minutes of critical care time was spent managing this patient, excluding procedures. Of this time, I, Dr. Mindy Triana, spent 23 minutes and CRUZITO Keyes, spent 10 minutes in accordance with split shared billing. This included personally reviewing all pertinent labs, imaging, microbiology and documentation. Also discussing the case with the patient and any available family, the admitting physician and any available ancillary staff.     Electronically signed by Mindy Triana MD, 03/09/23, 1:32 PM EST.

## 2023-03-09 NOTE — PLAN OF CARE
Goal Outcome Evaluation:      Patient remains on two inotrope's, milrinone and dobutamine to help with the patient heart squeeze. MD plans to wean those in the next 24-48 hours depending on how the BP holds. IV lasix for diuresing. Sufficient urine output. Patient is still complaining of increased shortness of breathe. Patient was able to get up to the chair for a couple hours which helped with the SOB. Chest X-ray was done and looked to have new pleural effusions. Family and patient were both educated on pulmonary edema and how it can caused the shortness of breathe and the increased anxiety. Patient was taught to not panic when this happens. MAP greater than 65 was maintained. Continue with current POC.

## 2023-03-09 NOTE — CONSULTS
"Purpose of the visit was to evaluate for: goals of care/advanced care planning and support for patient/family. Spoke with RN, patient and family and discussed palliative care.      Assessment:The patient is in CCU, on nasal canula, HOB elevated, reports SOA but no pain or anxiety at this time.    Recommendations/Plan: Family report the patient will transfer to [facility] in [place].    Tasks Completed: Emotional Support.    Other Comments: Palliative care nurse was updated by the primary nurse and provided \"Clinical Strategies to Avoid Blood Transfusions\" to the family to take with them and also added information in the chart for the providers to use. The spouse, daughter and CARYL were all at bedside and expressed the patient is ready to get out of the hospital however they know he is not ready. I provided my contact information should they have any more concerns or questions.     -Malia CARLSON, RN, [facility]   Palliative Care    3/9/2023 12:58 EST      "

## 2023-03-09 NOTE — CONSULTS
Indication: for IV medication and difficult IV access    Assessment:    Left arm has a 3 MM diameter cephalic vein. There is no basilic vein. There is a very large brachial vein. However, the median nerve, brachial artery and biceps muscle obscure any approach path to the brachial vein along the entire upper arm. Left arm is not a candidate for midline or PICC.    Right arm has a 3 MM diameter cephalic vein. There is no basilic vein. There is a very large brachial vein. However, the median nerve, brachial artery, and biceps muscle obscure any approach path to the brachial vein along the entire upper arm. Therefore, Right arm is also not a candidate for midline or PICC.    I have informed the ICU MD and RN.    Bright Pinedo RN

## 2023-03-09 NOTE — ACP (ADVANCE CARE PLANNING)
[employer]: Do Not Give Blood Products.  Web based resources for clinicians  [email or web address]/medical-library  To contact a local rep. E-mail  [email or web address]            -Malia CARLSON, RN, [facility]   Palliative Care    3/9/2023 12:26 EST

## 2023-03-09 NOTE — PLAN OF CARE
Goal Outcome Evaluation:  Plan of Care Reviewed With: patient, daughter        Progress: improving  Outcome Evaluation: Patient remains on dobutamine and milrinone drips, not titrated per MD request. Patient lasix dose increased today to facilitate output. No complaints of pain, but does report SOA at times, treated with PRN 2L NC O2 for comfort. Patient in atrial fib/flutter on monitor throughout shift with BBB noted, MD aware. Pressures stable, levophed on MAR if needed. Family in and out all shift. Up to chair numerous times.  Mary Bailon RN

## 2023-03-10 NOTE — PROGRESS NOTES
Pulmonary / Critical Care progress Note      Patient Name: Vince Jain  : 1950  MRN: [id number]  Primary Care Physician:  Yady Schneider DO  Referring Physician: Sanchez Zaman MD  Date of admission: 3/6/2023    Subjective   Subjective     Reason for Consult/ Chief Complaint:   Cardiogenic shock, respiratory failure requiring mechanical ventilation    Interim summary  Patient taken to Cath Lab  STEMI LAD, PCI placed, cardiogenic shock temporarily stabilized with Impella, intubated.       Over the past 24 hours  In bed with head of bed elevated  Continues on 2 [facility]  Patient with decrease p.o. intake due to feeling of suffocating while eating, and increased work of breathing due to shortness of breath  Family at bedside  Urinary output 1.65 L over the past 24 hours  Received IV morphine to help with feelings of shortness of breath and work of breathing  Continued on milrinone and dobutamine  Received IV Lasix    Overnight  With episodes of sleep apnea with drop in O2      Today   In bed with head of bed elevated  Agreeable to iron replacement  Still with shortness of breath and increased work of breathing, hesitant on using IV morphine  Awaiting bed at [facility] for further evaluation given his cardiogenic shock and ischemic cardiomyopathy  Urine output 4.475 over the past 24 hours; with a -3.5 L fluid balance  On 2 L nasal cannula    Review of Systems  General: Fatigue, weakness; otherwise denied complaints  HEENT: Denied complaints  Respiratory: Shortness of breath, LARSON, nonproductive cough; otherwise denied complaints  Cardiovascular: LARSON; otherwise denied complaints  GI: Denied complaints  Neurologic: Denied complaints  Skin: Denied complaints    Personal History     Past Medical History:   Diagnosis Date   • Cataract    • Glaucoma    • Head injury     x2 1st 15yrs ago and last 2yrs ago   • Hernia, inguinal     right   • Hyperlipidemia    • Hypertension    • Prostatitis    • Stroke (HCC)      unsure was told he had a little one       Past Surgical History:   Procedure Laterality Date   • CATARACT EXTRACTION Right    • HERNIA REPAIR Right    • TONSILLECTOMY     • VEIN SURGERY Left        Family History: family history includes Cancer in his father; Stroke in his mother. Otherwise pertinent FHx was reviewed and not pertinent to current issue.    Social History:  reports that he has never smoked. He has never been exposed to tobacco smoke. He has never used smokeless tobacco. He reports that he does not currently use alcohol. He reports that he does not use drugs.    Home Medications:  furosemide, rosuvastatin, and tamsulosin    Allergies:  No Known Allergies    Objective    Objective     Vitals:   Temp:  [97.9 °F (36.6 °C)-101.4 °F (38.6 °C)] 97.9 °F (36.6 °C)  Heart Rate:  [] 99  Resp:  [12-22] 20  BP: ()/(51-78) 105/64  Flow (L/min):  [2-4] 2    Physical Exam:  Vital Signs Reviewed   General:  Alert, NAD.  Sitting up in bed   HEENT:  PERRL, EOMI.    Neck:  No JVD, no thyromegaly  Lymph: no axillary, cervical, supraclavicular lymphadenopathy noted bilaterally  Chest:  Clear to auscultation bilaterally, no work of breathing noted on 2 L nasal cannula  CV: RRR, no M/G/R, pulses 2+  Abd:  Soft, NT, ND, +BS  EXT:  no clubbing, no cyanosis, no edema  Neuro:  A&Ox3, CN grossly intact, no focal deficits.  Skin: No rashes or lesions noted    Result Review    Result Review:  I have personally reviewed the results from the time of this admission to 3/10/2023 12:15 EST and agree with these findings:  [x]  Laboratory  [x]  Microbiology  [x]  Radiology  []  EKG/Telemetry   []  Cardiology/Vascular   []  Pathology  []  Old records  []  Other:  Most notable findings include:       Lab 03/10/23  0431 03/09/23  1552 03/08/23  2340 03/08/23  1528 03/08/23  0311 03/07/23  1159 03/07/23  0240 03/06/23  1500 03/06/23  1412 03/06/23  0927   WBC 14.50*  --  15.37*  --  17.58*  --  17.23*  --   --  4.21   HEMOGLOBIN  8.2*  --  8.5*  --  9.2*  --  10.7*  --  12.5* 12.0*   HEMATOCRIT 24.8*  --  25.9*  --  28.4*  --  33.1*  --  39.7 36.7*   PLATELETS 165  --  141  --  145  --  187  --   --  191   SODIUM 137 137 136 135* 138 139 140  --   --  137   SODIUM, ARTERIAL  --   --   --   --   --   --   --    < >  --   --    POTASSIUM 2.9* 3.6 3.9 4.3 4.3 5.3* 5.3*  --   --  3.7   CHLORIDE 97* 99 102 101 106 109* 110*  --   --  105   CO2 24.5 25.0 21.7* 20.6* 20.7* 16.4* 18.0*  --   --  20.3*   BUN 55* 49* 46* 45* 38* 30* 26*  --   --  18   CREATININE 2.01* 2.08* 2.15* 2.03* 2.02* 1.98* 1.60*  --   --  1.11   GLUCOSE 170* 159* 221* 273* 213* 201* 199*  --   --  254*   GLUCOSE, ARTERIAL  --   --   --   --   --   --   --    < >  --   --    CALCIUM 8.2* 8.0* 8.0* 7.9* 7.8* 7.5* 7.4*  --   --  7.9*   PHOSPHORUS 3.5 2.9 2.9  --  3.5 4.3  --   --   --   --    TOTAL PROTEIN 6.1  --  5.8* 5.7* 5.1* 5.1*  --   --   --   --    ALBUMIN 3.5 3.5 3.4* 3.5 3.1* 3.0*  --   --   --   --    GLOBULIN 2.6  --  2.4 2.2 2.0 2.1  --   --   --   --     < > = values in this interval not displayed.       Assessment & Plan   Assessment / Plan     Active Hospital Problems:  Active Hospital Problems    Diagnosis    • **STEMI involving left anterior descending coronary artery (HCC)    • Cardiogenic shock (HCC)    • S/P coronary artery stent placement      Impression:  Acute cardiogenic shock  STEMI status post PCI to LAD  Respiratory failure requiring mechanical ventilation  Metabolic acidosis   Lactic acidosis  Hyperglycemia  [employer] patient    Plan:  -Morphine 1 mg as needed every 2 hours for air hunger  -Continue supplemental O2.  Titrate to maintain SPO2 90% or greater  -Arterial line remains in place.  Connected to [employer] to monitor cardiac index  -Continue dobutamine 5mcg/kg/min. Monitor for for arrhythmias  -Continue milrinone at 0.25 mcg/KG/min  -Levophed has been weaned off.  -Cardiology on board.  Patient has been accepted at [facility] for evaluation for  LVAD considerations given cardiogenic shock with ischemic cardiomyopathy requiring inotropic support with dobutamine and milrinone; patient also has requested patient to be transferred to a HF center.  -Continue Silva catheter, monitor urine output I/O closely  -Monitor proBNP, continue aggressive diuresis as tolerated.     -Continue Lasix to 60 mg every 8 hours.  Received metolazone 5 mg x 1 on 3/9  -Trend renal function electrolytes.  Replace as needed.  Potassium replaced today via IV and p.o.  -Lactic cleared 3/8.  -Continue aspirin, statin, Brilinta for PCI to LAD  -Heparin for DVT prophylaxis  -Avoid beta-blockade given shock   -Monitor femoral access site for hematoma; Monitor hemoglobin. Patient Jehovah Witness  -Receiving Venofer IV x 1 today  -Continue cardiac diet with low sodium  -Continue basal insulin to 13 units twice daily and continue SSI to optimize glucose control  -On cardiac diet.  Patient however is with poor p.o. intake.  Dietitian consult in place for supplements with meals and as needed    DVT prophylaxis:  Medical DVT prophylaxis orders are present.     Code Status and Medical Interventions:   Ordered at: 03/06/23 145     Level Of Support Discussed With:    Patient     Code Status (Patient has no pulse and is not breathing):    CPR (Attempt to Resuscitate)     Medical Interventions (Patient has pulse or is breathing):    Full Support     Release to patient:    Routine Release     Patient is critically ill with acute cardiogenic shock requiring multiple inotropic support. 33 minutes of critical care time was spent managing this patient, excluding procedures. Of this time, I, Dr. Mindy Triana, spent 23 minutes and CRUZITO Keyes, spent 10 minutes in accordance with split shared billing. This included personally reviewing all pertinent labs, imaging, microbiology and documentation. Also discussing the case with the patient and any available family, the admitting physician and any available  ancillary staff.     Electronically signed by Mindy Triana MD, 03/10/23, 12:56 PM EST.

## 2023-03-10 NOTE — DISCHARGE SUMMARY
[facility]         DISCHARGE SUMMARY    Patient Name: Vince Jain  : 1950  MRN: [id number]    Date of Admission: 3/6/2023  Date of Discharge:  3/10/2023  Primary Care Physician: Yady Schneider DO    Consults     No orders found from 2023 to 3/7/2023.          Presenting Problem:   STEMI (ST elevation myocardial infarction) (HCC) [I21.3]    Active and Resolved Hospital Problems:  Active Hospital Problems    Diagnosis POA   • **STEMI involving left anterior descending coronary artery (HCC) [I21.02] Yes   • Cardiogenic shock (HCC) [R57.0] Yes   • S/P coronary artery stent placement [Z95.5] Not Applicable      Resolved Hospital Problems   No resolved problems to display.       Hospital Course     Hospital Course:  Vince Jain is a 72 y.o. male with hypertension, hyperlipidemia, and prior CVA who presented as a STEMI alert on the morning of .  Per the patient's family, he developed severe midsternal chest pain at home about 8:00 AM shortly after working on a ladder outside.  Upon EMS arrival, he was noted to be diaphoretic and in shock with a BP in the 50s/30s.  An ECG obtained by EMS in the field demonstrated sinus bradycardia with marked ST elevation throughout the precordial leads and reciprocal ST depression, consistent with an acute anterior/anterolateral STEMI.  Mr. Jain was taken directly to the Cath Lab upon arrival and an emergent left heart cath was performed.  It demonstrated a culprit occluded mid LAD with heavy thrombus burden, as well as some thrombus in the distal segment of the OM branch of the circumflex.  In addition, Mr. Jain was noted to have severe LV dysfunction with an estimated ejection fraction of 20% and anteroapical akinesis.  Due to worsening cardiogenic shock, an Impella CP percutaneous LVAD was placed prior to consideration of PCI.  The patient's hemodynamics rapidly improved after placement of the Impella device, and successful  PCI was performed to the culprit LAD with restoration of CINDY-3 flow throughout the LAD system and no evidence of complications.  During the case, Mr Jain had to be emergently intubated by Dr. Triana due to agitation and worsening respiratory distress.  A couple hours after PCI was performed, the patient was observed to have significant oozing around the Impella CP repositioning sheath site with evidence of a left femoral hematoma.  Because Mr. Jain is a Protestant and declines use of any blood products, I decided it was best to remove the Impella device at that point to prevent possible worsening bleeding complications (which we would have no ability to treat).  The Impella was removed and firm manual pressure was held above the insertion site for approximately an hour, with good hemostasis achieved.  Mr. Jain was subsequently transferred to the CCU.  He remained intubated overnight, but was able to be extubated the next morning.  He remained on vasopressor support with Levophed until the following afternoon, but it then was able to be successfully weaned.  However, he did require inotropic support with both low dose dobutamine and milrinone, and he remained on both of these medications throughout the remainder of his hospital course.   The patient's post-MI echocardiogram showed significant improvement in his left ventricular systolic function with an estimated ejection fraction of 35% and anteroapical wall motion abnormalities, consistent with LAD-territory infarct.  He was also noted to have moderate-severe concentric LVH and a severely dilated left atrium, as well as moderate-severe mitral regurgitation and evidence of pulmonary hypertension (estimated PASP was 61 mmHg).  Mr. Jain was diuresed with IV Lasix, which was ultimately increased to 60 mg every 8 hours.  He diuresed well over the next few days, with approximately 5-6 L of negative fluid balance.  However, he continued to  have significant dyspnea with even minimal ambulation and remained on supplemental O2 and inotropic therapy.  He also developed acute kidney injury, likely related to both cardiogenic shock on arrival and contrast exposure from his PCI.  His Cr level ultimately peaked at 2.15 before starting to trend down.  The patient's family was concerned about his slow recovery and ongoing need for low dose inotropic therapy, and they wished for him to be transferred to the advanced heart failure service at [facility] in [place].  Accordingly, hospital transfer was arranged and Mr. Jain was transferred to [facility] on the afternoon of March 10th.  Further management will be based on his hospital course there.      Day of Discharge     Vital Signs:  Temp:  [97.9 °F (36.6 °C)-101.4 °F (38.6 °C)] 99 °F (37.2 °C)  Heart Rate:  [] 91  Resp:  [12-22] 20  BP: ()/(53-78) 105/64  Flow (L/min):  [2-4] 2    Pertinent  and/or Most Recent Results     LAB RESULTS:      Lab 03/10/23  1129 03/10/23  0431 03/08/23  2340 03/08/23  2033 03/08/23  1528 03/08/23  1139 03/08/23  0311 03/07/23  0537 03/07/23  0240 03/06/23  1500 03/06/23  1412 03/06/23  1023 03/06/23  0927   WBC  --  14.50* 15.37*  --   --   --  17.58*  --  17.23*  --   --   --  4.21   HEMOGLOBIN  --  8.2* 8.5*  --   --   --  9.2*  --  10.7*  --  12.5*  --  12.0*   HEMATOCRIT  --  24.8* 25.9*  --   --   --  28.4*  --  33.1*  --  39.7  --  36.7*   PLATELETS  --  165 141  --   --   --  145  --  187  --   --   --  191   NEUTROS ABS  --   --   --   --   --   --   --   --   --   --   --   --  2.22   IMMATURE GRANS (ABS)  --   --   --   --   --   --   --   --   --   --   --   --  0.02   LYMPHS ABS  --   --   --   --   --   --   --   --   --   --   --   --  1.46   MONOS ABS  --   --   --   --   --   --   --   --   --   --   --   --  0.47   EOS ABS  --   --   --   --   --   --   --   --   --   --   --   --  0.02   MCV  --  83.8 84.4  --   --   --  86.6  --  87.3  --    --   --  85.2   LACTATE 1.7  --  1.9 2.7* 3.1* 3.1* 2.1*   < > 4.5*   < >  --   --   --    LACTATE, ARTERIAL  --   --   --   --   --   --   --   --   --    < >  --    < >  --     < > = values in this interval not displayed.         Lab 03/10/23  0431 03/09/23  1552 03/08/23  2340 03/08/23  1528 03/08/23  0311 03/07/23  1159 03/07/23  0240 03/06/23  1500 03/06/23  0927   SODIUM 137 137 136 135* 138 139 140  --   --    SODIUM, ARTERIAL  --   --   --   --   --   --   --  136.6  --    POTASSIUM 2.9* 3.6 3.9 4.3 4.3 5.3* 5.3*  --   --    CHLORIDE 97* 99 102 101 106 109* 110*  --   --    CO2 24.5 25.0 21.7* 20.6* 20.7* 16.4* 18.0*  --   --    ANION GAP 15.5* 13.0 12.3 13.4 11.3 13.6 12.0  --   --    BUN 55* 49* 46* 45* 38* 30* 26*  --   --    CREATININE 2.01* 2.08* 2.15* 2.03* 2.02* 1.98* 1.60*  --   --    EGFR 35.0* 33.6* 32.3* 34.6* 34.8* 35.7* 46.1*  --   --    GLUCOSE 170* 159* 221* 273* 213* 201* 199*  --   --    GLUCOSE, ARTERIAL  --   --   --   --   --   --   --  309*  --    CALCIUM 8.2* 8.0* 8.0* 7.9* 7.8* 7.5* 7.4*  --   --    IONIZED CALCIUM  --   --   --   --   --   --   --  1.03*  --    MAGNESIUM 2.3 2.2 2.1  --  1.9 2.0 1.9  --    < >   PHOSPHORUS 3.5 2.9 2.9  --  3.5 4.3  --   --   --    HEMOGLOBIN A1C  --   --   --   --   --   --  7.60*  --   --     < > = values in this interval not displayed.         Lab 03/10/23  0431 03/09/23  1552 03/08/23  2340 03/08/23  1528 03/08/23  0311 03/07/23  1159   TOTAL PROTEIN 6.1  --  5.8* 5.7* 5.1* 5.1*   ALBUMIN 3.5 3.5 3.4* 3.5 3.1* 3.0*   GLOBULIN 2.6  --  2.4 2.2 2.0 2.1   ALT (SGPT) 60*  --  70* 78* 80* 94*   AST (SGOT) 210*  --  280* 317* 388* 519*   BILIRUBIN 1.0  --  0.6 0.5 0.4 0.4   ALK PHOS 147*  --  102 92 78 78         Lab 03/07/23  0537 03/07/23  0240   PROBNP 6,603.0*  --    HSTROP T >10,000* >10,000*         Lab 03/07/23  0240   CHOLESTEROL 108   LDL CHOL 48   HDL CHOL 41   TRIGLYCERIDES 99         Lab 03/10/23  0431   IRON 18*   IRON SATURATION 6*   TIBC  323   TRANSFERRIN 217         Lab 03/06/23  2046 03/06/23  1500 03/06/23  1023   PH, ARTERIAL 7.321* 7.261* 7.298*   PCO2, ARTERIAL 31.5* 44.0 32.9*   PO2 .6* 297.6* 62.8*   O2 SATURATION ART 98.7 99.0 89.6*   FIO2 60 95  --    HCO3 ART 15.9* 19.3* 15.8*   BASE EXCESS ART -9.0* -7.5* -9.6*   CARBOXYHEMOGLOBIN 0.3 0.1 0.3     Brief Urine Lab Results  (Last result in the past 365 days)      Color   Clarity   Blood   Leuk Est   Nitrite   Protein   CREAT   Urine HCG        01/06/23 0910 Yellow   Clear   Negative   Negative   Negative   Negative               Microbiology Results (last 10 days)     ** No results found for the last 240 hours. **          XR Chest 1 View    Result Date: 3/9/2023  Impression:   1. Right IJ line tip is within the superior vena cava.  There is no pneumothorax. 2. Interstitial changes worse involving the right lung which are nonspecific and could be seen with inflammatory infectious process or edema 3. Cardiomegaly       BRADY CONTRERAS MD       Electronically Signed and Approved By: BRADY CONTRERAS MD on 3/09/2023 at 13:39             XR Chest 1 View    Result Date: 3/9/2023  Impression:  Cardiomegaly with new patchy bilateral airspace disease and probable trace effusions.  Correlate for cardiac decompensation.       THEO AUGUSTINE MD       Electronically Signed and Approved By: THEO AUGUSTINE MD on 3/09/2023 at 2:11             XR Chest 1 View    Result Date: 3/8/2023  Impression:  Significant improvement in appearance pulmonary edema, with some mild residual interstitial edema persisting in the lung bases.       MARTIR DARBY MD       Electronically Signed and Approved By: MARTIR DARBY MD on 3/08/2023 at 10:28             XR Chest 1 View    Result Date: 3/6/2023  Impression:   ET tube and NG tube in place.  Borderline cardiomegaly.   Bilateral pulmonary infiltrate or edema is evident.       BERENICE CARLOS MD       Electronically Signed and Approved By: BERENICE CARLOS MD on 3/06/2023 at  17:21               Results for orders placed during the hospital encounter of 03/06/23    Adult Transthoracic Echo Complete w/ Color, Spectral and Contrast if necessary per protocol    Interpretation Summary  Moderately reduced left ventricular systolic function with an estimated ejection fraction of 35%.  Mid-distal anterior/anteroseptal/anterolateral and apical akinesis, consistent with LAD-territory infarct.  Diastolic function could not be fully assessed secondary to atrial flutter, but the tissue Doppler findings were consistent with elevated left atrial pressure.  Moderate to severe concentric left ventricular hypertrophy.  Severely dilated left atrium.  Mildly dilated right atrium.  Thickened mitral leaflets with moderate to severe mitral regurgitation.  Consider JONATHON for further evaluation if clinically warranted.  Mild to moderate tricuspid regurgitation.  Estimated right ventricular systolic pressure was severely elevated at 61 mmHg.  No evidence of pericardial effusion.      Labs Pending at Discharge:  None      Discharge Details        Discharge Medications      ASK your doctor about these medications      Instructions Start Date   furosemide 20 MG tablet  Commonly known as: Lasix   40 mg, Oral, Daily PRN, Prn , unsure the name of water pill he takes      rosuvastatin 20 MG tablet  Commonly known as: Crestor   20 mg, Oral, Daily      tamsulosin 0.4 MG capsule 24 hr capsule  Commonly known as: FLOMAX   0.4 mg, Oral, Daily             No Known Allergies      Discharge Disposition:  Transferred to [facility] in [facility] (advanced heart failure service)    Diet:  Cardiac, low sodium diet    Discharge Activity:   As tolerated      CODE STATUS:  Code Status and Medical Interventions:   Ordered at: 03/06/23 1346     Level Of Support Discussed With:    Patient     Code Status (Patient has no pulse and is not breathing):    CPR (Attempt to Resuscitate)     Medical Interventions (Patient has pulse or is  breathing):    Full Support     Release to patient:    Routine Release       Future Appointments   Date Time Provider Department Center   4/19/2023 11:30 AM Justine Hernandez MD [facility]   4/26/2023 10:30 AM Bri Coleman APRN [facility]       Time spent on Discharge including face to face service:  46 minutes    Electronically signed by Sanchez Zaman MD, 03/10/23, 3:05 PM EST.

## 2023-03-10 NOTE — PROGRESS NOTES
[facility]     Progress Note    Patient Name: Vince Jain  : 1950  MRN: [id number]  Primary Care Physician:  Yady Schneider DO  Date of admission: 3/6/2023    Subjective   Subjective     HPI:  Mr. Jain states his dyspnea has improved somewhat, but he remains on 2L of supplemental O2.  He has diuresed extremely well over the last 24 hours with 3.5 L of negative fluid balance.  No chest pain/pressure, nausea, or lightheadedness reported.     Review of Systems  Negative except as per HPI    Objective   Objective     Vitals:   Temp:  [97.9 °F (36.6 °C)-101.4 °F (38.6 °C)] 99 °F (37.2 °C)  Heart Rate:  [] 99  Resp:  [12-22] 20  BP: ()/(51-78) 105/64  Flow (L/min):  [2-4] 2    Physical Exam  General: alert and oriented, no distress  Neck: supple, no JVD, no bruit, no masses  Chest: equal air entry bilaterally, no tachypnea, normal effort, faint right basilar crackles  CV: regular rate and rhythm, S1 and S2 present, +S3 gallop, 3/6 systolic murmur, no friction rub, PMI non-displaced  Abdomen: soft, obese, non-tender, non-distended, + bowel sounds  Extremities: no lower extremity edema, no cyanosis or clubbing, 2+ radial pulses  Neuro: normal speech, no focal deficits  Skin: cool and dry, no rashes, no jaundice    Current Medications:   •  acetaminophen  •  aspirin  •  senna-docusate sodium **AND** polyethylene glycol **AND** bisacodyl **AND** bisacodyl  •  dextrose  •  dextrose  •  DOBUTamine  •  ferric gluconate  •  furosemide  •  glucagon (human recombinant)  •  heparin (porcine)  •  insulin detemir  •  insulin lispro  •  iopamidol  •  milrinone  •  Morphine **AND** naloxone  •  ondansetron **OR** ondansetron  •  potassium chloride  •  rosuvastatin  •  sodium chloride  •  sodium chloride  •  sodium chloride  •  sodium chloride  •  sodium chloride  •  ticagrelor     Result Review    Result Review:  I have personally reviewed the results from the time of this admission to 3/10/2023  13:06 EST and agree with these findings:  [x]  Laboratory  []  Microbiology  [x]  Radiology  [x]  EKG/Telemetry   [x]  Cardiology/Vascular   []  Pathology  []  Old records  []  Other:  Most notable findings include: Cr = 2.01, Na = 137, K = 2.9, Mg = 2.3, glucose = 170, lactic acid = 1.7, AST = 210, ALT = 60, Hgb = 8.2, platelets = 165      Assessment & Plan   Assessment / Plan     Brief Patient Summary:  Vince Jain is a 72 y.o. male with:  -Acute anterior/anterolateral STEMI, s/p emergent PCI to the culprit mid LAD  -Cardiogenic shock, s/p Impella CP LVAD placement and removal, improving - patient is now off vasopressors, but remains on inotropic support with low dose dobutamine and milrinone  -Ischemic cardiomyopathy, EF=35% per echo this admission  -Moderate-severe mitral regurgitation  -Paroxysmal atrial fibrillation/flutter with RVR  -Acute respiratory failure requiring mechanical ventilation, resolved (patient extubated three days ago)   -Acute cardiogenic pulmonary edema, improved with diuresis  -Transaminitis, likely due to hepatic congestion from CHF and/or hypotension/shock on admission, improving   -Acute kidney injury, Cr peaked at 2.15 and is now trending down (baseline Cr = 1.1-1.2)  -Lactic acidosis, resolved  -History of CVA     Active Hospital Problems:  Active Hospital Problems    Diagnosis    • **STEMI involving left anterior descending coronary artery (HCC)    • Cardiogenic shock (HCC)    • S/P coronary artery stent placement        Plan:   -He has been accepted for transfer to the advanced heart failure service at [facility] in [place] and we are awaiting a bed assignment at [facility] and likely transfer later this afternoon.  -Continue inotropic support with dobutamine and milrinone at current dosing pending hospital transfer  -Continue DAPT with aspirin and ticagrelor, as well as high-intensity statin therapy  -Wean supplemental O2 as tolerated, maintaining an oxygen sat  >90%  -Continue IV Lasix at current dosing pending transfer to [facility].  He has diuresed very well and his renal function has stabilized.      DVT prophylaxis:  Medical DVT prophylaxis orders are present.    CODE STATUS:    Level Of Support Discussed With: Patient  Code Status (Patient has no pulse and is not breathing): CPR (Attempt to Resuscitate)  Medical Interventions (Patient has pulse or is breathing): Full Support  Release to patient: Routine Release    Electronically signed by Sanchez Zaman MD, 03/10/23, 1:06 PM EST.

## 2023-03-10 NOTE — PROGRESS NOTES
[facility]     Progress Note    Patient Name: Vince Jain  : 1950  MRN: [id number]  Primary Care Physician:  Yady Schneider DO  Date of admission: 3/6/2023    Subjective   Subjective     HPI:  Mr. Jain continues to have some dyspnea with even minimal activity/ambulation, but continues to diurese adequately.  He remains on supplemental O2 by nasal cannula.  He is still requiring inotropic support with dobutamine and milrinone.     Review of Systems  Negative except as per HPI    Objective   Objective     Vitals:   Temp:  [98.7 °F (37.1 °C)-101.4 °F (38.6 °C)] 101.4 °F (38.6 °C)  Heart Rate:  [] 95  Resp:  [11-26] 11  BP: ()/(51-96) 105/64  Flow (L/min):  [2] 2    Physical Exam  General: alert and oriented, no acute distress  Neck: supple, mild-moderate JVD, no bruit, no masses  Chest: decreased air movement over lower fields, no tachypnea, normal effort with no increased work of breathing on supplemental O2  CV: regular rate and rhythm, S1 and S2 present, +S3 gallop, 3/6 systolic murmur at apex  Abdomen: soft, non-distended, non-tender, + bowel sounds  Extremities: no cyanosis, no significant lower extremity edema  Neuro: normal speech, no focal deficits    Current Medications:   •  acetaminophen  •  aspirin  •  senna-docusate sodium **AND** polyethylene glycol **AND** bisacodyl **AND** bisacodyl  •  dextrose  •  dextrose  •  DOBUTamine  •  furosemide  •  glucagon (human recombinant)  •  heparin (porcine)  •  insulin detemir  •  insulin lispro  •  iopamidol  •  milrinone  •  Morphine **AND** naloxone  •  ondansetron **OR** ondansetron  •  rosuvastatin  •  sodium chloride  •  sodium chloride  •  sodium chloride  •  sodium chloride  •  sodium chloride  •  ticagrelor     Result Review    Result Review:  I have personally reviewed the results from the time of this admission to 3/9/2023 20:50 EST and agree with these findings:  [x]  Laboratory  []  Microbiology  [x]  Radiology  [x]   EKG/Telemetry   [x]  Cardiology/Vascular   []  Pathology  []  Old records  []  Other:  Most notable findings include: Cr = 2.08 (decreased from 2.15), Na = 137, K = 3.6, Mg = 2.2, glucose = 159,     CXR 3/9/23:  FINDINGS:          The heart looks enlarged.  There is a right IJ line noted with its tip in the superior vena cava.    There is some interstitial changes within the lungs greater in the right lung area.  This has been   suggested.  This could be secondary to inflammatory infectious process or edema.  There are no   large pleural effusions.  IMPRESSION:               1. Right IJ line tip is within the superior vena cava.  There is no pneumothorax.  2. Interstitial changes worse involving the right lung which are nonspecific and could be seen with   inflammatory infectious process or edema  3. Cardiomegaly.      Assessment & Plan   Assessment / Plan     Brief Patient Summary:  Vince Jain is a 72 y.o. male with:  -Acute anterior/anterolateral STEMI, s/p emergent PCI to the culprit mid LAD  -Cardiogenic shock, s/p Impella CP LVAD placement and removal, improving - patient is now off vasopressors, but remains on inotropic support with low dose dobutamine and milrinone  -Ischemic cardiomyopathy, EF=35% per echo this admission  -Moderate-severe mitral regurgitation  -Paroxysmal atrial fibrillation/flutter with RVR  -Acute respiratory failure requiring mechanical ventilation, resolved (patient extubated two days ago)   -Acute cardiogenic pulmonary edema, improved with gentle diuresis  -Transaminitis, likely due to hepatic congestion from CHF and/or hypotension/shock on admission, improving   -Lactic acidosis, improving  -History of CVA     Active Hospital Problems:  Active Hospital Problems    Diagnosis    • **STEMI involving left anterior descending coronary artery (HCC)    • Cardiogenic shock (HCC)    • S/P coronary artery stent placement        Plan:   -Mr. Jain's family wishes for him to be  transferred to the advanced heart failure service at [facility] in [place], so will work on arranging transfer.  He may need to be considered for a permanent LVAD as destination therapy if his hemodynamics do not improve soon.   -Continue inotropic support for now, weaning milrinone as tolerated   -Supplemental O2 to maintain SaO2 >90%  -Continue diuresis with IV Lasix at current dosing  -Continue DAPT and statin  -Repeat a CBC and CMP in the morning   -DM management as per Critical Care       DVT prophylaxis:  Medical DVT prophylaxis orders are present.    CODE STATUS:    Level Of Support Discussed With: Patient  Code Status (Patient has no pulse and is not breathing): CPR (Attempt to Resuscitate)  Medical Interventions (Patient has pulse or is breathing): Full Support  Release to patient: Routine Release    Electronically signed by Sanchez Zaman MD, 03/09/23, 8:50 PM EST.

## 2023-03-10 NOTE — PLAN OF CARE
Goal Outcome Evaluation:         Still remains on milrinone and dobutamine. Lasix was increased to 60mg IV. Urine output was sufficient. Carolann and central line still in place. NC 2-4L. Patient did have episodes of sleep apnea throughout the night. No anxious episodes.     SOB seems to be improving. BLE edema still present.     Cardiology plans to discuss with advanced heart failure group in [facility] about possibly transferring patient there for consideration of a LVAD. Family remains at bedside with the patient and assist him when needed.     Continue with current POC.

## (undated) DEVICE — NC TREK NEO™ CORONARY DILATATION CATHETER 3.50 MM X 15 MM / RAPID-EXCHANGE: Brand: NC TREK NEO™

## (undated) DEVICE — CATH F5INF TLPIGST145 110CM6SH: Brand: INFINITI

## (undated) DEVICE — CATH 4F INF PIG 145Â° 110 CM: Brand: INFINITI

## (undated) DEVICE — Device

## (undated) DEVICE — CANSTR COL ENGINE FOR INDIGO SYS

## (undated) DEVICE — CATH F6 ST JL 4 100CM: Brand: SUPERTORQUE

## (undated) DEVICE — 6F .070 XB 4 100CM: Brand: VISTA BRITE TIP

## (undated) DEVICE — GW WWSS35145 WHOLEY WIRE V04: Brand: WHOLEY™

## (undated) DEVICE — SI AVANTI+ 7F STD W/GW  NO OBT: Brand: AVANTI

## (undated) DEVICE — CATH F6 ST JL 3.5 100CM: Brand: SUPERTORQUE

## (undated) DEVICE — KT CATH INDIGO RX ASP 140CM

## (undated) DEVICE — RADIFOCUS OPTITORQUE ANGIOGRAPHIC CATHETER: Brand: OPTITORQUE

## (undated) DEVICE — GLIDESHEATH SLENDER STAINLESS STEEL KIT: Brand: GLIDESHEATH SLENDER

## (undated) DEVICE — CATH LAB PACK: Brand: MEDLINE INDUSTRIES, INC.

## (undated) DEVICE — GW AMPLTZ SUPERSTIFF STR .035IN 180CM

## (undated) DEVICE — TREK CORONARY DILATATION CATHETER 2.25 MM X 15 MM / RAPID-EXCHANGE: Brand: TREK

## (undated) DEVICE — 6F .070 XB 3.5 100CM: Brand: VISTA BRITE TIP

## (undated) DEVICE — KT CATH CAD SUP IMPELLA/CP 9/14F 5L

## (undated) DEVICE — CATH IMG IVUS EAGLE EYE PLATIN RX DIGITAL .014IN 5FR

## (undated) DEVICE — NC TREK CORONARY DILATATION CATHETER 1.5 MM X 12 MM / RAPID-EXCHANGE: Brand: NC TREK

## (undated) DEVICE — PINNACLE PRECISION ACCESS SYSTEM INTRODUCER SHEATH: Brand: PINNACLE PRECISION ACCESS SYSTEM

## (undated) DEVICE — GW FC FLOP/TP .035 260CM 3MM

## (undated) DEVICE — NC TREK NEO™ CORONARY DILATATION CATHETER 4.00 MM X 20 MM / RAPID-EXCHANGE: Brand: NC TREK NEO™

## (undated) DEVICE — CATH F6 ST JR 4 100CM: Brand: SUPERTORQUE

## (undated) DEVICE — MINI TREK CORONARY DILATATION CATHETER 2.0 MM X 15 MM / RAPID-EXCHANGE: Brand: MINI TREK

## (undated) DEVICE — CATH PACE PACEL BIOPOLAR HEART CRV 5F 110CM RT

## (undated) DEVICE — KT INTRO MIC VSI SMOTH REG 4F 40CM 7CM

## (undated) DEVICE — RUNTHROUGH NS EXTRA FLOPPY PTCA GUIDEWIRE: Brand: RUNTHROUGH

## (undated) DEVICE — NC TREK CORONARY DILATATION CATHETER 2.75 MM X 15 MM / RAPID-EXCHANGE: Brand: NC TREK

## (undated) DEVICE — TREK CORONARY DILATATION CATHETER 3.50 MM X 20 MM / RAPID-EXCHANGE: Brand: TREK